# Patient Record
Sex: FEMALE | Race: WHITE | NOT HISPANIC OR LATINO | Employment: OTHER | URBAN - NONMETROPOLITAN AREA
[De-identification: names, ages, dates, MRNs, and addresses within clinical notes are randomized per-mention and may not be internally consistent; named-entity substitution may affect disease eponyms.]

---

## 2017-04-03 ENCOUNTER — OFFICE VISIT (OUTPATIENT)
Dept: CARDIOLOGY | Facility: CLINIC | Age: 62
End: 2017-04-03

## 2017-04-03 VITALS
HEIGHT: 65 IN | BODY MASS INDEX: 30.32 KG/M2 | HEART RATE: 95 BPM | RESPIRATION RATE: 16 BRPM | WEIGHT: 182 LBS | DIASTOLIC BLOOD PRESSURE: 91 MMHG | SYSTOLIC BLOOD PRESSURE: 125 MMHG

## 2017-04-03 DIAGNOSIS — I34.1 MITRAL VALVE PROLAPSE: Primary | ICD-10-CM

## 2017-04-03 DIAGNOSIS — Z87.898 HISTORY OF SYNCOPE: ICD-10-CM

## 2017-04-03 DIAGNOSIS — R06.09 DYSPNEA ON EXERTION: ICD-10-CM

## 2017-04-03 PROBLEM — I82.409 DEEP VEIN THROMBOSIS (HCC): Status: ACTIVE | Noted: 2017-04-03

## 2017-04-03 PROBLEM — R07.2 PRECORDIAL PAIN: Status: ACTIVE | Noted: 2017-04-03

## 2017-04-03 PROBLEM — R00.2 PALPITATIONS: Status: ACTIVE | Noted: 2017-04-03

## 2017-04-03 PROBLEM — I10 ESSENTIAL HYPERTENSION: Status: ACTIVE | Noted: 2017-04-03

## 2017-04-03 PROCEDURE — 99204 OFFICE O/P NEW MOD 45 MIN: CPT | Performed by: INTERNAL MEDICINE

## 2017-04-03 PROCEDURE — 93000 ELECTROCARDIOGRAM COMPLETE: CPT | Performed by: INTERNAL MEDICINE

## 2017-04-03 RX ORDER — METOPROLOL TARTRATE 50 MG/1
50 TABLET, FILM COATED ORAL 2 TIMES DAILY
COMMUNITY

## 2017-04-03 RX ORDER — DULOXETIN HYDROCHLORIDE 60 MG/1
120 CAPSULE, DELAYED RELEASE ORAL DAILY
COMMUNITY

## 2017-04-03 RX ORDER — LISINOPRIL 10 MG/1
10 TABLET ORAL EVERY OTHER DAY
COMMUNITY
End: 2019-04-08

## 2017-04-03 RX ORDER — ROSUVASTATIN CALCIUM 20 MG/1
20 TABLET, COATED ORAL DAILY
COMMUNITY

## 2017-04-03 RX ORDER — TRAVOPROST OPHTHALMIC SOLUTION 0.04 MG/ML
1 SOLUTION OPHTHALMIC EVERY EVENING
COMMUNITY
End: 2019-04-08

## 2017-04-03 RX ORDER — ASPIRIN 81 MG/1
81 TABLET ORAL WEEKLY
COMMUNITY

## 2017-04-03 NOTE — PROGRESS NOTES
"Haily Nava MD  Irene Wren  1955 04/03/2017    Patient Active Problem List   Diagnosis   • Essential hypertension   • History of syncope   • Palpitations   • Precordial pain   • Deep vein thrombosis   • Dyslipidemia   • Gastroesophageal reflux disease   • Mitral valve prolapse   • Pulmonary embolism   • Dyspnea on exertion       Dear Haily Nava MD:    Laya Wren is a 61 y.o. female with the problems as listed above, presents to establish cardiac care.    History of Present Illness: Patient reports a history of MVP and leaky heart valve.  She has relocated here from [place].  She reports feeling very fatigued/tired that has increased during the past 2 months.  This comes and goes.  She has complaints of palpitations with feeling of skipping and racing heartbeat. Four years ago she reports that she had a very bad episode of chest pain in which she called an ambulance and was admitted to the hospital.  Her cardiac workup at that time apparently was negative.  (Pertinent records not available to me at this time)  No recent significant chest pain reported.     A few days ago she felt her heart fluttering.  It went away on it's own.  She has complaints of occasional dizziness. She has a history of syncope, with last episode being last week.  She was walking down her hallway and went to go into another room when she passed out.  When she awoke she states she felt \"normal\".   She states she can usually tell before it happens.  Denies any associated palpitations with the syncope.  No complaints of headaches or focal neurologic symptoms.  No history of seizure problems  She has dyspnea with mild-mod exertion.  She's been getting tired and fatigued for the last few months.     Cardiac risk factors:hypercholesterolemia, hypertension and Age and postmenopausal status.    Allergies   Allergen Reactions   • Codeine    • Penicillins    • Sulfa Antibiotics    :      Current Outpatient " Prescriptions:   •  aspirin 81 MG EC tablet, Take 81 mg by mouth 1 (One) Time Per Week., Disp: , Rfl:   •  DULoxetine (CYMBALTA) 60 MG capsule, Take 120 mg by mouth Daily. Takes 2 60mg tab, Disp: , Rfl:   •  lisinopril (PRINIVIL,ZESTRIL) 10 MG tablet, Take  by mouth Daily., Disp: , Rfl:   •  metFORMIN (GLUCOPHAGE) 500 MG tablet, Take 500 mg by mouth 2 (Two) Times a Day With Meals., Disp: , Rfl:   •  metoprolol tartrate (LOPRESSOR) 50 MG tablet, Take 50 mg by mouth 2 (Two) Times a Day., Disp: , Rfl:   •  rosuvastatin (CRESTOR) 20 MG tablet, Take 20 mg by mouth Daily., Disp: , Rfl:   •  travoprost, ELIZABETH free, (TRAVATAN) 0.004 % solution ophthalmic solution, 1 drop Every Evening. in affected eye(s), Disp: , Rfl:     Past Medical History:   Diagnosis Date   • Clotting disorder    • Diabetes mellitus    • Hyperlipidemia    • Hypertension    • Mitral valve prolapse    • Valvular disease      Past Surgical History:   Procedure Laterality Date   • CATARACT EXTRACTION     •  SECTION      x2   • COLON SURGERY     • HERNIA REPAIR     • KNEE SURGERY       Family History   Problem Relation Age of Onset   • Heart disease Father    • Heart attack Father      Social History   Substance Use Topics   • Smoking status: Never Smoker   • Smokeless tobacco: Never Used   • Alcohol use Yes      Comment: social       Review of Systems   Constitution: Positive for malaise/fatigue. Negative for chills and fever.   HENT: Positive for headaches. Negative for nosebleeds and sore throat.    Eyes: Positive for vision loss in left eye.   Cardiovascular: Positive for palpitations.   Respiratory: Positive for shortness of breath. Negative for cough, hemoptysis and wheezing.    Gastrointestinal: Negative for abdominal pain, hematemesis, hematochezia, melena, nausea and vomiting.   Genitourinary: Negative for dysuria and hematuria.   Neurological: Positive for dizziness and light-headedness.   Psychiatric/Behavioral: Positive for depression.  "      Objective   Blood pressure 125/91, pulse 95, resp. rate 16, height 65\" (165.1 cm), weight 182 lb (82.6 kg).  Body mass index is 30.29 kg/(m^2).        Physical Exam   Constitutional: She is oriented to person, place, and time. She appears well-developed and well-nourished.   HENT:   Mouth/Throat: Oropharynx is clear and moist.   Eyes: EOM are normal. Pupils are equal, round, and reactive to light.   Neck: Neck supple. No JVD present. No tracheal deviation present. No thyromegaly present.   Cardiovascular: Normal rate, regular rhythm, S1 normal and S2 normal.  Exam reveals no gallop and no friction rub.    No murmur heard.  Pulmonary/Chest: Effort normal and breath sounds normal.   Abdominal: Soft. Bowel sounds are normal. She exhibits no mass. There is no tenderness.   Musculoskeletal: Normal range of motion. She exhibits no edema.   Lymphadenopathy:     She has no cervical adenopathy.   Neurological: She is alert and oriented to person, place, and time.   Skin: Skin is warm and dry. No rash noted.   Psychiatric: She has a normal mood and affect.   Vitals reviewed.          ECG 12 Lead  Date/Time: 4/3/2017 1:09 PM  Performed by: ANAND ACUNA  Authorized by: ANAND ACUNA   Rhythm: sinus rhythm  BPM: 90  Comments: Non specific st/t wave changes.  QTc 427              Assessment/Plan :  Irene was seen today for establish care, palpitations and shortness of breath.  Diagnoses and all orders for this visit:    1.  Mitral valve prolapse     Adult Transthoracic Echo Complete; Future  2.  History of syncope    Holter Monitor - 24 Hour; Future  3.  Dyspnea on exertion       Recommendations:    1. Evaluate with echo doppler study and 24 hour holter monitor.  2. Check BP twice daily.   3. Keep well hydrated.    Return in about 4 weeks (around 5/1/2017).    As always, I appreciate very much the opportunity to participate in the cardiovascular care of your patients.      With Best Regards,    Anand Acuna MD, " [facility]      Scribed for Anand Cornelius MD by Elly Cuevas CMA 4/3/2017  7:37 PM    I, Anand Cornelius MD, personally performed the services described in this documentation as scribed by the above named individual in my presence, and it is both accurate and complete.  4/3/2017  7:37 PM

## 2017-04-17 ENCOUNTER — HOSPITAL ENCOUNTER (OUTPATIENT)
Dept: RESPIRATORY THERAPY | Facility: HOSPITAL | Age: 62
Discharge: HOME | End: 2017-04-17
Attending: INTERNAL MEDICINE

## 2017-04-17 ENCOUNTER — HOSPITAL ENCOUNTER (OUTPATIENT)
Dept: CARDIOLOGY | Facility: HOSPITAL | Age: 62
Discharge: HOME | End: 2017-04-17
Attending: INTERNAL MEDICINE | Admitting: INTERNAL MEDICINE

## 2017-04-17 DIAGNOSIS — Z87.898 HISTORY OF SYNCOPE: ICD-10-CM

## 2017-04-17 DIAGNOSIS — I34.1 MITRAL VALVE PROLAPSE: ICD-10-CM

## 2017-04-17 PROCEDURE — 93306 TTE W/DOPPLER COMPLETE: CPT | Performed by: INTERNAL MEDICINE

## 2017-04-17 PROCEDURE — 93225 XTRNL ECG REC<48 HRS REC: CPT

## 2017-04-17 PROCEDURE — 93226 XTRNL ECG REC<48 HR SCAN A/R: CPT

## 2017-04-17 PROCEDURE — 93306 TTE W/DOPPLER COMPLETE: CPT

## 2017-04-21 LAB
BH CV ECHO MEAS - % IVS THICK: 34.9 %
BH CV ECHO MEAS - % LVPW THICK: 67.3 %
BH CV ECHO MEAS - ACS: 1.8 CM
BH CV ECHO MEAS - AO ROOT AREA (BSA CORRECTED): 1.7
BH CV ECHO MEAS - AO ROOT AREA: 7.8 CM^2
BH CV ECHO MEAS - AO ROOT DIAM: 3.1 CM
BH CV ECHO MEAS - BSA(HAYCOCK): 2 M^2
BH CV ECHO MEAS - BSA: 1.9 M^2
BH CV ECHO MEAS - BZI_BMI: 30.3 KILOGRAMS/M^2
BH CV ECHO MEAS - BZI_METRIC_HEIGHT: 165.1 CM
BH CV ECHO MEAS - BZI_METRIC_WEIGHT: 82.6 KG
BH CV ECHO MEAS - CONTRAST EF 4CH: 54.3 ML/M^2
BH CV ECHO MEAS - EDV(CUBED): 125.3 ML
BH CV ECHO MEAS - EDV(MOD-SP4): 46 ML
BH CV ECHO MEAS - EDV(TEICH): 118.4 ML
BH CV ECHO MEAS - EF(CUBED): 75.8 %
BH CV ECHO MEAS - EF(MOD-SP4): 54.3 %
BH CV ECHO MEAS - EF(TEICH): 67.6 %
BH CV ECHO MEAS - ESV(CUBED): 30.3 ML
BH CV ECHO MEAS - ESV(MOD-SP4): 21 ML
BH CV ECHO MEAS - ESV(TEICH): 38.4 ML
BH CV ECHO MEAS - FS: 37.7 %
BH CV ECHO MEAS - IVS/LVPW: 1
BH CV ECHO MEAS - IVSD: 1.1 CM
BH CV ECHO MEAS - IVSS: 1.5 CM
BH CV ECHO MEAS - LA DIMENSION: 4.1 CM
BH CV ECHO MEAS - LA/AO: 1.3
BH CV ECHO MEAS - LV DIASTOLIC VOL/BSA (35-75): 24.2 ML/M^2
BH CV ECHO MEAS - LV MASS(C)D: 209.3 GRAMS
BH CV ECHO MEAS - LV MASS(C)DI: 110.1 GRAMS/M^2
BH CV ECHO MEAS - LV MASS(C)S: 200.1 GRAMS
BH CV ECHO MEAS - LV MASS(C)SI: 105.3 GRAMS/M^2
BH CV ECHO MEAS - LV SYSTOLIC VOL/BSA (12-30): 11.1 ML/M^2
BH CV ECHO MEAS - LVIDD: 5 CM
BH CV ECHO MEAS - LVIDS: 3.1 CM
BH CV ECHO MEAS - LVLD AP4: 7.3 CM
BH CV ECHO MEAS - LVLS AP4: 6.2 CM
BH CV ECHO MEAS - LVOT AREA (M): 2.5 CM^2
BH CV ECHO MEAS - LVOT AREA: 2.7 CM^2
BH CV ECHO MEAS - LVOT DIAM: 1.8 CM
BH CV ECHO MEAS - LVPWD: 1.1 CM
BH CV ECHO MEAS - LVPWS: 1.9 CM
BH CV ECHO MEAS - MV A MAX VEL: 70.6 CM/SEC
BH CV ECHO MEAS - MV E MAX VEL: 51.8 CM/SEC
BH CV ECHO MEAS - MV E/A: 0.73
BH CV ECHO MEAS - PA ACC SLOPE: 778.6 CM/SEC^2
BH CV ECHO MEAS - PA ACC TIME: 0.1 SEC
BH CV ECHO MEAS - PA PR(ACCEL): 33.1 MMHG
BH CV ECHO MEAS - RAP SYSTOLE: 10 MMHG
BH CV ECHO MEAS - RVDD: 2.4 CM
BH CV ECHO MEAS - RVSP: 37.6 MMHG
BH CV ECHO MEAS - SI(CUBED): 50 ML/M^2
BH CV ECHO MEAS - SI(MOD-SP4): 13.2 ML/M^2
BH CV ECHO MEAS - SI(TEICH): 42.1 ML/M^2
BH CV ECHO MEAS - SV(CUBED): 95 ML
BH CV ECHO MEAS - SV(MOD-SP4): 25 ML
BH CV ECHO MEAS - SV(TEICH): 80 ML
BH CV ECHO MEAS - TR MAX VEL: 262.8 CM/SEC

## 2017-04-23 PROCEDURE — 93227 XTRNL ECG REC<48 HR R&I: CPT | Performed by: INTERNAL MEDICINE

## 2017-07-03 ENCOUNTER — OFFICE VISIT (OUTPATIENT)
Dept: CARDIOLOGY | Facility: CLINIC | Age: 62
End: 2017-07-03

## 2017-07-03 VITALS
WEIGHT: 181 LBS | HEIGHT: 65 IN | DIASTOLIC BLOOD PRESSURE: 84 MMHG | BODY MASS INDEX: 30.16 KG/M2 | SYSTOLIC BLOOD PRESSURE: 136 MMHG | HEART RATE: 73 BPM

## 2017-07-03 DIAGNOSIS — I10 ESSENTIAL HYPERTENSION: ICD-10-CM

## 2017-07-03 DIAGNOSIS — R00.2 PALPITATIONS: Primary | ICD-10-CM

## 2017-07-03 DIAGNOSIS — Z87.898 HISTORY OF SYNCOPE: ICD-10-CM

## 2017-07-03 DIAGNOSIS — E78.5 DYSLIPIDEMIA: ICD-10-CM

## 2017-07-03 PROCEDURE — 93000 ELECTROCARDIOGRAM COMPLETE: CPT | Performed by: INTERNAL MEDICINE

## 2017-07-03 PROCEDURE — 99213 OFFICE O/P EST LOW 20 MIN: CPT | Performed by: INTERNAL MEDICINE

## 2017-07-03 NOTE — PROGRESS NOTES
Haily Nava MD  Irene Wren  1955 07/03/2017    Patient Active Problem List   Diagnosis   • Essential hypertension   • History of syncope   • Palpitations   • Precordial pain   • Deep vein thrombosis   • Dyslipidemia   • Gastroesophageal reflux disease   • Mitral valve prolapse   • Pulmonary embolism   • Dyspnea on exertion       Dear Haily Nava MD:    Subjective     Irene Wren is a 62 y.o. female with the problems as listed above, presents      History of Present Illness:Ms. Wren is a pleasant 59-year-old  female with previous history of dizziness and palpitations and a remote history of mitral valve prolapse was evaluated recently with an echo Doppler study and Holter monitor and she is here now to review the results of the tests.  She denies any complaints of any further dizziness or syncope.  She denies any chest pain or palpitations.  Overall she's been feeling better.        Allergies   Allergen Reactions   • Codeine    • Penicillins    • Sulfa Antibiotics    :      Current Outpatient Prescriptions:   •  aspirin 81 MG EC tablet, Take 81 mg by mouth 1 (One) Time Per Week., Disp: , Rfl:   •  DULoxetine (CYMBALTA) 60 MG capsule, Take 120 mg by mouth Daily. Takes 2 60mg tab, Disp: , Rfl:   •  lisinopril (PRINIVIL,ZESTRIL) 10 MG tablet, Take 10 mg by mouth Every Other Day., Disp: , Rfl:   •  metFORMIN (GLUCOPHAGE) 500 MG tablet, Take 500 mg by mouth 2 (Two) Times a Day With Meals., Disp: , Rfl:   •  metoprolol tartrate (LOPRESSOR) 50 MG tablet, Take 50 mg by mouth 2 (Two) Times a Day., Disp: , Rfl:   •  rosuvastatin (CRESTOR) 20 MG tablet, Take 20 mg by mouth Daily., Disp: , Rfl:   •  travoprost, ELIZABETH free, (TRAVATAN) 0.004 % solution ophthalmic solution, 1 drop Every Evening. in affected eye(s), Disp: , Rfl:       The following portions of the patient's history were reviewed and updated as appropriate: allergies, current medications, past family history, past medical history, past  "social history, past surgical history and problem list.    Social History   Substance Use Topics   • Smoking status: Never Smoker   • Smokeless tobacco: Never Used   • Alcohol use Yes      Comment: social       Review of Systems   Constitution: Negative for chills and fever.   HENT: Negative for headaches, nosebleeds and sore throat.    Cardiovascular: Positive for palpitations (Races, skips, and flutters). Negative for chest pain, leg swelling and syncope.   Respiratory: Negative for cough, hemoptysis, shortness of breath and wheezing.    Gastrointestinal: Negative for abdominal pain, hematemesis, hematochezia, melena, nausea and vomiting.   Genitourinary: Negative for dysuria and hematuria.   Neurological: Negative for dizziness.       Objective   Vitals:    07/03/17 1331   BP: 136/84   BP Location: Right arm   Patient Position: Sitting   Cuff Size: Adult   Pulse: 73   Weight: 181 lb (82.1 kg)   Height: 65\" (165.1 cm)     Body mass index is 30.12 kg/(m^2).        Physical Exam   Constitutional: She is oriented to person, place, and time. She appears well-developed and well-nourished.   HENT:   Head: Normocephalic.   Eyes: Conjunctivae and EOM are normal.   Neck: Normal range of motion. Neck supple. No JVD present. No tracheal deviation present. No thyromegaly present.   Cardiovascular: Normal rate and regular rhythm.  Exam reveals no gallop and no friction rub.    No murmur heard.  Pulmonary/Chest: Breath sounds normal. No respiratory distress. She has no wheezes. She has no rales.   Abdominal: Soft. Bowel sounds are normal. She exhibits no mass. There is no tenderness.   Musculoskeletal: She exhibits no edema.   Neurological: She is alert and oriented to person, place, and time. No cranial nerve deficit.   Skin: Skin is warm and dry.   Psychiatric: She has a normal mood and affect.                            Echo Doppler study on 4/3/17 revealed:  · The study is technically difficult for diagnosis.  · Normal left " ventricular cavity size and wall thickness noted. All left ventricular wall segments contract normally.  · Estimated EF appears to be in the range of 61 - 65%.  · The aortic valve is not well visualized. The aortic valve is grossly normal in structure. No aortic valve regurgitation is present. No hemodynamically significant aortic valve stenosis is present.  · The mitral valve is normal in structure. No mitral valve regurgitation is present. No significant mitral valve stenosis is present.  · The tricuspid valve is normal. No tricuspid valve stenosis is present. No tricuspid valve regurgitation is present. Estimated right ventricular systolic pressure from tricuspid regurgitation is mildly elevated (35-45 mmHg).  · There is no evidence of pericardial effusion.      · Holter monitor recently on 4/3/17 revealed:  ·  The predominant rhythm noted during the testing period was sinus rhythm.  · Occasional PACs and PVCs.No SVT or V.Tach.  · Sinoatrial node conduction was normal. No atrioventricular block noted.  · Chest pain was reported during the monitoring period  · Chest pain had no correlations.            ECG 12 Lead  Date/Time: 7/3/2017 1:36 PM  Performed by: KASHIF ACUNA  Authorized by: KASHIF ACUNA   Rhythm: sinus rhythm  BPM: 71  Conduction: conduction normal  ST Segments: ST segments normal  T Waves: T waves normal  Other: no other findings  Clinical impression: normal ECG            Assessment/Plan      1. PalpitationsWith no significant arrhythmias noted on the Holter monitor recently     2. Essential hypertension , Controlled     3. History of syncope with no recurrence.      4. Dyslipidemia          Recommendations:    1. I reviewed the test results with the patient and reassured her.  2. Continue current medications.  3. We'll see her back in about 8-9 months.     Return in about 9 months (around 4/3/2018).    As always, I appreciate very much the opportunity to participate in the cardiovascular care  of your patients.      With Best Regards,    Anand Cornelius MD, [facility]    Dragon disclaimer:  Much of this encounter note is an electronic transcription/translation of spoken language to printed text. The electronic translation of spoken language may permit erroneous, or at times, nonsensical words or phrases to be inadvertently transcribed; Although I have reviewed the note for such errors, some may still exist.

## 2019-03-12 ENCOUNTER — OUTSIDE FACILITY SERVICE (OUTPATIENT)
Dept: CARDIOLOGY | Facility: CLINIC | Age: 64
End: 2019-03-12

## 2019-03-12 PROCEDURE — 93306 TTE W/DOPPLER COMPLETE: CPT | Performed by: INTERNAL MEDICINE

## 2019-04-08 ENCOUNTER — OFFICE VISIT (OUTPATIENT)
Dept: SURGERY | Facility: CLINIC | Age: 64
End: 2019-04-08

## 2019-04-08 VITALS
SYSTOLIC BLOOD PRESSURE: 175 MMHG | BODY MASS INDEX: 33.49 KG/M2 | OXYGEN SATURATION: 99 % | TEMPERATURE: 97.8 F | HEART RATE: 74 BPM | HEIGHT: 64 IN | DIASTOLIC BLOOD PRESSURE: 100 MMHG | WEIGHT: 196.2 LBS

## 2019-04-08 DIAGNOSIS — Z86.010 HISTORY OF COLON POLYPS: ICD-10-CM

## 2019-04-08 DIAGNOSIS — R10.13 EPIGASTRIC PAIN: Primary | ICD-10-CM

## 2019-04-08 PROCEDURE — 99203 OFFICE O/P NEW LOW 30 MIN: CPT | Performed by: SURGERY

## 2019-04-08 RX ORDER — BISACODYL 5 MG/1
5 TABLET, DELAYED RELEASE ORAL DAILY
Qty: 4 TABLET | Refills: 0 | Status: SHIPPED | OUTPATIENT
Start: 2019-04-08

## 2019-04-08 RX ORDER — PRAVASTATIN SODIUM 20 MG
20 TABLET ORAL DAILY
COMMUNITY

## 2019-04-08 RX ORDER — POLYETHYLENE GLYCOL 3350 17 G/17G
238 POWDER, FOR SOLUTION ORAL ONCE
Qty: 14 PACKET | Refills: 0 | Status: SHIPPED | OUTPATIENT
Start: 2019-04-08 | End: 2019-04-08

## 2019-04-08 NOTE — PROGRESS NOTES
Patient: Irene Wren    YOB: 1955    Date: 04/08/2019    Primary Care Provider: Haily Nava MD    Reason for Consultation: Hernia    Chief Complaint   Patient presents with   • Abdominal Pain       SUBJECTIVE:    History of present illness: Patient referred for abdominal pain.  About a month ago she had severe epigastric pain that she describes as a shock.  It lasted for several minutes.  She had another episode subsequently.  Since then she has had intermittent epigastric pain mild in nature.  The more recent episodes are usually after lifting.  No specific causative agent on the initial event a month ago.  She has no nausea or vomiting.  No reflux symptoms.  No lower GI complaints.  Nothing specifically otherwise made the pain better or worse.  Pain did not radiate.  Also has a history of a colon polyp requiring surgery for removal and had partial colectomy.  It has been at least 5 years since her last colonoscopy.    Review of Systems   Constitutional: Negative for chills, fever and unexpected weight change.   HENT: Negative for hearing loss, trouble swallowing and voice change.    Eyes: Negative for visual disturbance.   Respiratory: Negative for apnea, cough, chest tightness, shortness of breath and wheezing.    Cardiovascular: Negative for chest pain, palpitations and leg swelling.   Gastrointestinal: Positive for abdominal pain. Negative for abdominal distention, anal bleeding, blood in stool, constipation, diarrhea, nausea, rectal pain and vomiting.   Endocrine: Negative for cold intolerance and heat intolerance.   Genitourinary: Negative for difficulty urinating, dysuria and flank pain.   Musculoskeletal: Negative for back pain and gait problem.   Skin: Negative for color change, rash and wound.   Neurological: Negative for dizziness, syncope, speech difficulty, weakness, light-headedness, numbness and headaches.   Hematological: Negative for adenopathy. Does not bruise/bleed easily.  "  Psychiatric/Behavioral: Negative for confusion. The patient is not nervous/anxious.        History:  Past Medical History:   Diagnosis Date   • Clotting disorder (CMS/HCC)    • Diabetes mellitus (CMS/HCC)    • Hyperlipidemia    • Hypertension    • Mitral valve prolapse    • MVP (mitral valve prolapse)    • Valvular disease        Past Surgical History:   Procedure Laterality Date   • CATARACT EXTRACTION     •  SECTION      x2   • COLON SURGERY     • HERNIA REPAIR     • KNEE SURGERY         Family History   Problem Relation Age of Onset   • Heart disease Father    • Heart attack Father        Social History     Tobacco Use   • Smoking status: Never Smoker   • Smokeless tobacco: Never Used   Substance Use Topics   • Alcohol use: Yes     Comment: social   • Drug use: No       Allergies:  Allergies   Allergen Reactions   • Codeine    • Penicillins    • Sulfa Antibiotics    • Latex Rash       Medications:    Current Outpatient Medications:   •  aspirin 81 MG EC tablet, Take 81 mg by mouth 1 (One) Time Per Week., Disp: , Rfl:   •  DULoxetine (CYMBALTA) 60 MG capsule, Take 120 mg by mouth Daily. Takes 2 60mg tab, Disp: , Rfl:   •  linagliptin (TRADJENTA) 5 MG tablet tablet, Take 5 mg by mouth Daily., Disp: , Rfl:   •  metoprolol tartrate (LOPRESSOR) 50 MG tablet, Take 50 mg by mouth 2 (Two) Times a Day., Disp: , Rfl:   •  pravastatin (PRAVACHOL) 20 MG tablet, Take 20 mg by mouth Daily., Disp: , Rfl:   •  rosuvastatin (CRESTOR) 20 MG tablet, Take 20 mg by mouth Daily., Disp: , Rfl:     OBJECTIVE:    Vital Signs:   Vitals:    19 1427   BP: 175/100   Pulse: 74   Temp: 97.8 °F (36.6 °C)   SpO2: 99%   Weight: 89 kg (196 lb 3.2 oz)   Height: 162.6 cm (64\")       Physical Exam:   General Appearance:    Alert, cooperative, in no acute distress   Head:    Normocephalic, without obvious abnormality, atraumatic   Eyes:            Normal.  No scleral icterus.  PERRLA    Lungs:     Clear to auscultation,respirations " regular, even and                  unlabored    Heart:    Regular rhythm and normal rate, normal S1 and S2, no            murmur   Abdomen:     Normal bowel sounds, no masses, no organomegaly, soft        non-tender, non-distended, no guarding,.  Well-healed midline scars.   Extremities:   Moves all extremities well, no edema, no cyanosis, no             redness   Skin:   No bleeding, bruising or rash   Neurologic:   Normal without gross deficits.   Psychiatric: No evidence of depression or anxiety          Results Review:   I reviewed the patient's new clinical results.  CT was reviewed including the films.  I am not convinced that the patient has recurrent ventral hernias.  Certainly no obvious hernia at the previous patch site.    Review of Systems was reviewed and confirmed as accurate today.    ASSESSMENT/PLAN:    1. Epigastric pain    2. History of colon polyps        As far as the epigastric pain is concerned this seems to be more muscular in nature involving the abdominal wall.  It is possible it may be somehow related to her previous hernia repair although the repair site itself appears to be intact on the CT scan.  She has a couple of areas above the umbilicus where there is a question of possible small hernias on the CT scan report but I am not convinced that these are in fact hernias.  These would be well away from the area in question as her pain is more in the epigastric pain.  For now I have asked her to avoid heavy lifting since this seems to exacerbate her symptoms.  Follow-up as needed.  If her symptoms get worse she will follow-up with me.  I do not think an upper endoscopy is warranted at this time.    She has a history of adenomatous polyp that required partial colectomy for removal.  It has been 5 years since her last colonoscopy.  I recommend a colonoscopy due to this.  I explained the procedure to the patient as well as the risks of bleeding and perforation and they understand the  ramifications of these potential complications and they wish to proceed.    Electronically signed by Pedro Delvalle MD  04/08/19

## 2019-04-15 ENCOUNTER — OUTSIDE FACILITY SERVICE (OUTPATIENT)
Dept: SURGERY | Facility: CLINIC | Age: 64
End: 2019-04-15

## 2019-04-15 PROCEDURE — 45385 COLONOSCOPY W/LESION REMOVAL: CPT | Performed by: SURGERY

## 2020-10-19 ENCOUNTER — TELEPHONE (OUTPATIENT)
Dept: MAMMOGRAPHY | Facility: HOSPITAL | Age: 65
End: 2020-10-19

## 2020-12-14 ENCOUNTER — APPOINTMENT (OUTPATIENT)
Dept: MAMMOGRAPHY | Facility: HOSPITAL | Age: 65
End: 2020-12-14

## 2021-01-05 ENCOUNTER — HOSPITAL ENCOUNTER (OUTPATIENT)
Dept: MAMMOGRAPHY | Facility: HOSPITAL | Age: 66
Discharge: HOME | End: 2021-01-05
Admitting: FAMILY MEDICINE

## 2021-01-05 DIAGNOSIS — Z12.31 VISIT FOR SCREENING MAMMOGRAM: ICD-10-CM

## 2021-01-05 PROCEDURE — 77067 SCR MAMMO BI INCL CAD: CPT

## 2021-01-05 PROCEDURE — 77067 SCR MAMMO BI INCL CAD: CPT | Performed by: RADIOLOGY

## 2021-01-05 PROCEDURE — 77063 BREAST TOMOSYNTHESIS BI: CPT | Performed by: RADIOLOGY

## 2021-01-05 PROCEDURE — 77063 BREAST TOMOSYNTHESIS BI: CPT

## 2021-02-23 ENCOUNTER — HOSPITAL ENCOUNTER (OUTPATIENT)
Dept: ULTRASOUND IMAGING | Facility: HOSPITAL | Age: 66
Discharge: HOME | End: 2021-02-23
Admitting: RADIOLOGY

## 2021-02-23 DIAGNOSIS — R92.8 ABNORMAL MAMMOGRAM: ICD-10-CM

## 2021-02-23 PROCEDURE — 76642 ULTRASOUND BREAST LIMITED: CPT | Performed by: RADIOLOGY

## 2021-02-23 PROCEDURE — 76642 ULTRASOUND BREAST LIMITED: CPT

## 2021-06-16 ENCOUNTER — OUTSIDE FACILITY SERVICE (OUTPATIENT)
Dept: CARDIOLOGY | Facility: CLINIC | Age: 66
End: 2021-06-16

## 2021-06-16 PROCEDURE — 93306 TTE W/DOPPLER COMPLETE: CPT | Performed by: INTERNAL MEDICINE

## 2022-04-05 ENCOUNTER — OUTSIDE FACILITY SERVICE (OUTPATIENT)
Dept: CARDIOLOGY | Facility: CLINIC | Age: 67
End: 2022-04-05

## 2022-04-05 PROCEDURE — 93306 TTE W/DOPPLER COMPLETE: CPT | Performed by: INTERNAL MEDICINE

## 2023-02-05 ENCOUNTER — APPOINTMENT (OUTPATIENT)
Dept: ULTRASOUND IMAGING | Facility: HOSPITAL | Age: 68
End: 2023-02-05
Payer: MEDICARE

## 2023-02-05 VITALS
RESPIRATION RATE: 17 BRPM | HEART RATE: 103 BPM | TEMPERATURE: 98.8 F | SYSTOLIC BLOOD PRESSURE: 161 MMHG | BODY MASS INDEX: 29.37 KG/M2 | DIASTOLIC BLOOD PRESSURE: 89 MMHG | HEIGHT: 64 IN | OXYGEN SATURATION: 97 % | WEIGHT: 172 LBS

## 2023-02-05 LAB
ALBUMIN SERPL-MCNC: 4 G/DL (ref 3.5–5.2)
ALBUMIN/GLOB SERPL: 1.2 G/DL
ALP SERPL-CCNC: 94 U/L (ref 39–117)
ALT SERPL W P-5'-P-CCNC: 11 U/L (ref 1–33)
ANION GAP SERPL CALCULATED.3IONS-SCNC: 8.5 MMOL/L (ref 5–15)
APTT PPP: 26 SECONDS (ref 26.5–34.5)
AST SERPL-CCNC: 15 U/L (ref 1–32)
BASOPHILS # BLD AUTO: 0.08 10*3/MM3 (ref 0–0.2)
BASOPHILS NFR BLD AUTO: 0.9 % (ref 0–1.5)
BILIRUB SERPL-MCNC: 0.2 MG/DL (ref 0–1.2)
BUN SERPL-MCNC: 19 MG/DL (ref 8–23)
BUN/CREAT SERPL: 14 (ref 7–25)
CALCIUM SPEC-SCNC: 9.7 MG/DL (ref 8.6–10.5)
CHLORIDE SERPL-SCNC: 97 MMOL/L (ref 98–107)
CO2 SERPL-SCNC: 27.5 MMOL/L (ref 22–29)
CREAT SERPL-MCNC: 1.36 MG/DL (ref 0.57–1)
CRP SERPL-MCNC: 0.89 MG/DL (ref 0–0.5)
D-LACTATE SERPL-SCNC: 0.9 MMOL/L (ref 0.5–2)
DEPRECATED RDW RBC AUTO: 44.5 FL (ref 37–54)
EGFRCR SERPLBLD CKD-EPI 2021: 42.5 ML/MIN/1.73
EOSINOPHIL # BLD AUTO: 0.2 10*3/MM3 (ref 0–0.4)
EOSINOPHIL NFR BLD AUTO: 2.1 % (ref 0.3–6.2)
ERYTHROCYTE [DISTWIDTH] IN BLOOD BY AUTOMATED COUNT: 13.4 % (ref 12.3–15.4)
ERYTHROCYTE [SEDIMENTATION RATE] IN BLOOD: 39 MM/HR (ref 0–30)
GLOBULIN UR ELPH-MCNC: 3.4 GM/DL
GLUCOSE SERPL-MCNC: 117 MG/DL (ref 65–99)
HCT VFR BLD AUTO: 41.8 % (ref 34–46.6)
HGB BLD-MCNC: 13.7 G/DL (ref 12–15.9)
HOLD SPECIMEN: NORMAL
HOLD SPECIMEN: NORMAL
IMM GRANULOCYTES # BLD AUTO: 0.02 10*3/MM3 (ref 0–0.05)
IMM GRANULOCYTES NFR BLD AUTO: 0.2 % (ref 0–0.5)
INR PPP: 0.93 (ref 0.9–1.1)
LYMPHOCYTES # BLD AUTO: 3.41 10*3/MM3 (ref 0.7–3.1)
LYMPHOCYTES NFR BLD AUTO: 36.6 % (ref 19.6–45.3)
MCH RBC QN AUTO: 29.7 PG (ref 26.6–33)
MCHC RBC AUTO-ENTMCNC: 32.8 G/DL (ref 31.5–35.7)
MCV RBC AUTO: 90.7 FL (ref 79–97)
MONOCYTES # BLD AUTO: 0.52 10*3/MM3 (ref 0.1–0.9)
MONOCYTES NFR BLD AUTO: 5.6 % (ref 5–12)
NEUTROPHILS NFR BLD AUTO: 5.08 10*3/MM3 (ref 1.7–7)
NEUTROPHILS NFR BLD AUTO: 54.6 % (ref 42.7–76)
NRBC BLD AUTO-RTO: 0 /100 WBC (ref 0–0.2)
PLATELET # BLD AUTO: 265 10*3/MM3 (ref 140–450)
PMV BLD AUTO: 9.1 FL (ref 6–12)
POTASSIUM SERPL-SCNC: 3.8 MMOL/L (ref 3.5–5.2)
PROT SERPL-MCNC: 7.4 G/DL (ref 6–8.5)
PROTHROMBIN TIME: 12.7 SECONDS (ref 12.1–14.7)
RBC # BLD AUTO: 4.61 10*6/MM3 (ref 3.77–5.28)
SODIUM SERPL-SCNC: 133 MMOL/L (ref 136–145)
WBC NRBC COR # BLD: 9.31 10*3/MM3 (ref 3.4–10.8)
WHOLE BLOOD HOLD COAG: NORMAL
WHOLE BLOOD HOLD SPECIMEN: NORMAL

## 2023-02-05 PROCEDURE — 93971 EXTREMITY STUDY: CPT

## 2023-02-05 PROCEDURE — 83605 ASSAY OF LACTIC ACID: CPT | Performed by: PHYSICIAN ASSISTANT

## 2023-02-05 PROCEDURE — 80053 COMPREHEN METABOLIC PANEL: CPT | Performed by: PHYSICIAN ASSISTANT

## 2023-02-05 PROCEDURE — 85610 PROTHROMBIN TIME: CPT | Performed by: PHYSICIAN ASSISTANT

## 2023-02-05 PROCEDURE — 85730 THROMBOPLASTIN TIME PARTIAL: CPT | Performed by: PHYSICIAN ASSISTANT

## 2023-02-05 PROCEDURE — 87040 BLOOD CULTURE FOR BACTERIA: CPT | Performed by: PHYSICIAN ASSISTANT

## 2023-02-05 PROCEDURE — 85652 RBC SED RATE AUTOMATED: CPT | Performed by: PHYSICIAN ASSISTANT

## 2023-02-05 PROCEDURE — 86140 C-REACTIVE PROTEIN: CPT | Performed by: PHYSICIAN ASSISTANT

## 2023-02-05 PROCEDURE — 36415 COLL VENOUS BLD VENIPUNCTURE: CPT

## 2023-02-05 PROCEDURE — 85025 COMPLETE CBC W/AUTO DIFF WBC: CPT | Performed by: PHYSICIAN ASSISTANT

## 2023-02-05 PROCEDURE — 99282 EMERGENCY DEPT VISIT SF MDM: CPT

## 2023-02-06 ENCOUNTER — HOSPITAL ENCOUNTER (EMERGENCY)
Facility: HOSPITAL | Age: 68
Discharge: LEFT BEFORE DISCHARGE | End: 2023-02-06
Admitting: STUDENT IN AN ORGANIZED HEALTH CARE EDUCATION/TRAINING PROGRAM
Payer: MEDICARE

## 2023-02-06 NOTE — ED NOTES
Pt came to triage requesting doctor to come speak to her about results, so that she could go home. Called to main for doctor to speak with pt about results.

## 2023-02-06 NOTE — ED NOTES
Pt came to desk to let us know she was leaving. Explained to pt the need to stay; pt stated she had to be home with her grandson. Pt said she would follow up on results tomorrow.

## 2023-02-06 NOTE — ED NOTES
MEDICAL SCREENING:    Reason for Visit: Right upper arm swelling with pain.  History of DVT.  Patient initially seen in triage.  The patient was advised further evaluation and diagnostic testing will be needed, some of the treatment and testing will be initiated in the lobby in order to begin the process.  The patient will be returned to the waiting area for the time being and possibly be re-assessed by a subsequent ED provider.  The patient will be brought back to the treatment area in as timely manner as possible.         Porfirio Chavira PA  02/05/23 2008

## 2023-02-10 LAB
BACTERIA SPEC AEROBE CULT: NORMAL
BACTERIA SPEC AEROBE CULT: NORMAL

## 2023-02-16 ENCOUNTER — LAB (OUTPATIENT)
Dept: LAB | Facility: HOSPITAL | Age: 68
End: 2023-02-16
Payer: MEDICARE

## 2023-02-16 ENCOUNTER — TRANSCRIBE ORDERS (OUTPATIENT)
Dept: LAB | Facility: HOSPITAL | Age: 68
End: 2023-02-16
Payer: MEDICARE

## 2023-02-16 DIAGNOSIS — R93.89 ABNORMAL FINDINGS ON DIAGNOSTIC IMAGING OF OTHER SPECIFIED BODY STRUCTURES: ICD-10-CM

## 2023-02-16 DIAGNOSIS — G45.9 TIA (TRANSIENT ISCHEMIC ATTACK): Primary | ICD-10-CM

## 2023-02-16 DIAGNOSIS — G45.9 TIA (TRANSIENT ISCHEMIC ATTACK): ICD-10-CM

## 2023-02-16 PROCEDURE — 85025 COMPLETE CBC W/AUTO DIFF WBC: CPT

## 2023-02-16 PROCEDURE — 84443 ASSAY THYROID STIM HORMONE: CPT

## 2023-02-16 PROCEDURE — 84479 ASSAY OF THYROID (T3 OR T4): CPT

## 2023-02-16 PROCEDURE — 84436 ASSAY OF TOTAL THYROXINE: CPT

## 2023-02-16 PROCEDURE — 36415 COLL VENOUS BLD VENIPUNCTURE: CPT

## 2023-02-16 PROCEDURE — 80061 LIPID PANEL: CPT

## 2023-02-16 PROCEDURE — 80053 COMPREHEN METABOLIC PANEL: CPT

## 2023-02-17 LAB
ALBUMIN SERPL-MCNC: 3.9 G/DL (ref 3.5–5.2)
ALBUMIN/GLOB SERPL: 1.4 G/DL
ALP SERPL-CCNC: 83 U/L (ref 39–117)
ALT SERPL W P-5'-P-CCNC: 8 U/L (ref 1–33)
ANION GAP SERPL CALCULATED.3IONS-SCNC: 8 MMOL/L (ref 5–15)
AST SERPL-CCNC: 11 U/L (ref 1–32)
BASOPHILS # BLD AUTO: 0.06 10*3/MM3 (ref 0–0.2)
BASOPHILS NFR BLD AUTO: 0.9 % (ref 0–1.5)
BILIRUB SERPL-MCNC: 0.4 MG/DL (ref 0–1.2)
BUN SERPL-MCNC: 22 MG/DL (ref 8–23)
BUN/CREAT SERPL: 19.8 (ref 7–25)
CALCIUM SPEC-SCNC: 9.3 MG/DL (ref 8.6–10.5)
CHLORIDE SERPL-SCNC: 103 MMOL/L (ref 98–107)
CHOLEST SERPL-MCNC: 208 MG/DL (ref 0–200)
CO2 SERPL-SCNC: 29 MMOL/L (ref 22–29)
CREAT SERPL-MCNC: 1.11 MG/DL (ref 0.57–1)
DEPRECATED RDW RBC AUTO: 43.1 FL (ref 37–54)
EGFRCR SERPLBLD CKD-EPI 2021: 54.3 ML/MIN/1.73
EOSINOPHIL # BLD AUTO: 0.14 10*3/MM3 (ref 0–0.4)
EOSINOPHIL NFR BLD AUTO: 2 % (ref 0.3–6.2)
ERYTHROCYTE [DISTWIDTH] IN BLOOD BY AUTOMATED COUNT: 13.2 % (ref 12.3–15.4)
GLOBULIN UR ELPH-MCNC: 2.8 GM/DL
GLUCOSE SERPL-MCNC: 135 MG/DL (ref 65–99)
HCT VFR BLD AUTO: 40 % (ref 34–46.6)
HDLC SERPL-MCNC: 37 MG/DL (ref 40–60)
HGB BLD-MCNC: 13.2 G/DL (ref 12–15.9)
IMM GRANULOCYTES # BLD AUTO: 0.01 10*3/MM3 (ref 0–0.05)
IMM GRANULOCYTES NFR BLD AUTO: 0.1 % (ref 0–0.5)
LDLC SERPL CALC-MCNC: 141 MG/DL (ref 0–100)
LDLC/HDLC SERPL: 3.73 {RATIO}
LYMPHOCYTES # BLD AUTO: 1.92 10*3/MM3 (ref 0.7–3.1)
LYMPHOCYTES NFR BLD AUTO: 27.5 % (ref 19.6–45.3)
MCH RBC QN AUTO: 29.1 PG (ref 26.6–33)
MCHC RBC AUTO-ENTMCNC: 33 G/DL (ref 31.5–35.7)
MCV RBC AUTO: 88.1 FL (ref 79–97)
MONOCYTES # BLD AUTO: 0.39 10*3/MM3 (ref 0.1–0.9)
MONOCYTES NFR BLD AUTO: 5.6 % (ref 5–12)
NEUTROPHILS NFR BLD AUTO: 4.47 10*3/MM3 (ref 1.7–7)
NEUTROPHILS NFR BLD AUTO: 63.9 % (ref 42.7–76)
NRBC BLD AUTO-RTO: 0 /100 WBC (ref 0–0.2)
PLATELET # BLD AUTO: 268 10*3/MM3 (ref 140–450)
PMV BLD AUTO: 9.9 FL (ref 6–12)
POTASSIUM SERPL-SCNC: 4.7 MMOL/L (ref 3.5–5.2)
PROT SERPL-MCNC: 6.7 G/DL (ref 6–8.5)
RBC # BLD AUTO: 4.54 10*6/MM3 (ref 3.77–5.28)
SODIUM SERPL-SCNC: 140 MMOL/L (ref 136–145)
T-UPTAKE NFR SERPL: 0.98 TBI (ref 0.8–1.3)
T4 SERPL-MCNC: 7.03 MCG/DL (ref 4.5–11.7)
TRIGL SERPL-MCNC: 165 MG/DL (ref 0–150)
TSH SERPL DL<=0.05 MIU/L-ACNC: 1.7 UIU/ML (ref 0.27–4.2)
VLDLC SERPL-MCNC: 30 MG/DL (ref 5–40)
WBC NRBC COR # BLD: 6.99 10*3/MM3 (ref 3.4–10.8)

## 2023-09-20 ENCOUNTER — TRANSCRIBE ORDERS (OUTPATIENT)
Dept: LAB | Facility: HOSPITAL | Age: 68
End: 2023-09-20
Payer: MEDICARE

## 2023-09-20 ENCOUNTER — LAB (OUTPATIENT)
Dept: LAB | Facility: HOSPITAL | Age: 68
End: 2023-09-20
Payer: MEDICARE

## 2023-09-20 DIAGNOSIS — E11.9 DIABETES MELLITUS WITHOUT COMPLICATION: Primary | ICD-10-CM

## 2023-09-20 DIAGNOSIS — Z79.899 ENCOUNTER FOR LONG-TERM (CURRENT) USE OF OTHER MEDICATIONS: ICD-10-CM

## 2023-09-20 DIAGNOSIS — E11.9 DIABETES MELLITUS WITHOUT COMPLICATION: ICD-10-CM

## 2023-09-20 PROCEDURE — 80053 COMPREHEN METABOLIC PANEL: CPT

## 2023-09-20 PROCEDURE — 84443 ASSAY THYROID STIM HORMONE: CPT

## 2023-09-20 PROCEDURE — 84479 ASSAY OF THYROID (T3 OR T4): CPT

## 2023-09-20 PROCEDURE — 84436 ASSAY OF TOTAL THYROXINE: CPT

## 2023-09-20 PROCEDURE — 80061 LIPID PANEL: CPT

## 2023-09-20 PROCEDURE — 83036 HEMOGLOBIN GLYCOSYLATED A1C: CPT

## 2023-09-20 PROCEDURE — 36415 COLL VENOUS BLD VENIPUNCTURE: CPT

## 2023-09-20 PROCEDURE — 85025 COMPLETE CBC W/AUTO DIFF WBC: CPT

## 2023-09-21 LAB
ALBUMIN SERPL-MCNC: 4.2 G/DL (ref 3.5–5.2)
ALBUMIN/GLOB SERPL: 1.3 G/DL
ALP SERPL-CCNC: 78 U/L (ref 39–117)
ALT SERPL W P-5'-P-CCNC: 16 U/L (ref 1–33)
ANION GAP SERPL CALCULATED.3IONS-SCNC: 11.1 MMOL/L (ref 5–15)
AST SERPL-CCNC: 23 U/L (ref 1–32)
BASOPHILS # BLD AUTO: 0.07 10*3/MM3 (ref 0–0.2)
BASOPHILS NFR BLD AUTO: 1.2 % (ref 0–1.5)
BILIRUB SERPL-MCNC: 0.5 MG/DL (ref 0–1.2)
BUN SERPL-MCNC: 20 MG/DL (ref 8–23)
BUN/CREAT SERPL: 16 (ref 7–25)
CALCIUM SPEC-SCNC: 9.5 MG/DL (ref 8.6–10.5)
CHLORIDE SERPL-SCNC: 103 MMOL/L (ref 98–107)
CHOLEST SERPL-MCNC: 213 MG/DL (ref 0–200)
CO2 SERPL-SCNC: 25.9 MMOL/L (ref 22–29)
CREAT SERPL-MCNC: 1.25 MG/DL (ref 0.57–1)
DEPRECATED RDW RBC AUTO: 43.9 FL (ref 37–54)
EGFRCR SERPLBLD CKD-EPI 2021: 46.8 ML/MIN/1.73
EOSINOPHIL # BLD AUTO: 0.14 10*3/MM3 (ref 0–0.4)
EOSINOPHIL NFR BLD AUTO: 2.5 % (ref 0.3–6.2)
ERYTHROCYTE [DISTWIDTH] IN BLOOD BY AUTOMATED COUNT: 13.7 % (ref 12.3–15.4)
GLOBULIN UR ELPH-MCNC: 3.3 GM/DL
GLUCOSE SERPL-MCNC: 109 MG/DL (ref 65–99)
HBA1C MFR BLD: 5.8 % (ref 4.8–5.6)
HCT VFR BLD AUTO: 42.1 % (ref 34–46.6)
HDLC SERPL-MCNC: 41 MG/DL (ref 40–60)
HGB BLD-MCNC: 14.1 G/DL (ref 12–15.9)
IMM GRANULOCYTES # BLD AUTO: 0 10*3/MM3 (ref 0–0.05)
IMM GRANULOCYTES NFR BLD AUTO: 0 % (ref 0–0.5)
LDLC SERPL CALC-MCNC: 152 MG/DL (ref 0–100)
LDLC/HDLC SERPL: 3.67 {RATIO}
LYMPHOCYTES # BLD AUTO: 1.98 10*3/MM3 (ref 0.7–3.1)
LYMPHOCYTES NFR BLD AUTO: 34.7 % (ref 19.6–45.3)
MCH RBC QN AUTO: 29.6 PG (ref 26.6–33)
MCHC RBC AUTO-ENTMCNC: 33.5 G/DL (ref 31.5–35.7)
MCV RBC AUTO: 88.4 FL (ref 79–97)
MONOCYTES # BLD AUTO: 0.38 10*3/MM3 (ref 0.1–0.9)
MONOCYTES NFR BLD AUTO: 6.7 % (ref 5–12)
NEUTROPHILS NFR BLD AUTO: 3.13 10*3/MM3 (ref 1.7–7)
NEUTROPHILS NFR BLD AUTO: 54.9 % (ref 42.7–76)
NRBC BLD AUTO-RTO: 0 /100 WBC (ref 0–0.2)
PLATELET # BLD AUTO: 294 10*3/MM3 (ref 140–450)
PMV BLD AUTO: 9.8 FL (ref 6–12)
POTASSIUM SERPL-SCNC: 4.7 MMOL/L (ref 3.5–5.2)
PROT SERPL-MCNC: 7.5 G/DL (ref 6–8.5)
RBC # BLD AUTO: 4.76 10*6/MM3 (ref 3.77–5.28)
SODIUM SERPL-SCNC: 140 MMOL/L (ref 136–145)
T-UPTAKE NFR SERPL: 1.04 TBI (ref 0.8–1.3)
T4 SERPL-MCNC: 8.52 MCG/DL (ref 4.5–11.7)
TRIGL SERPL-MCNC: 108 MG/DL (ref 0–150)
TSH SERPL DL<=0.05 MIU/L-ACNC: 1.55 UIU/ML (ref 0.27–4.2)
VLDLC SERPL-MCNC: 20 MG/DL (ref 5–40)
WBC NRBC COR # BLD: 5.7 10*3/MM3 (ref 3.4–10.8)

## 2024-01-25 ENCOUNTER — TRANSCRIBE ORDERS (OUTPATIENT)
Dept: ADMINISTRATIVE | Facility: HOSPITAL | Age: 69
End: 2024-01-25
Payer: MEDICARE

## 2024-01-25 DIAGNOSIS — Z12.31 VISIT FOR SCREENING MAMMOGRAM: Primary | ICD-10-CM

## 2024-02-07 ENCOUNTER — HOSPITAL ENCOUNTER (OUTPATIENT)
Facility: HOSPITAL | Age: 69
Discharge: HOME | End: 2024-02-07
Admitting: FAMILY MEDICINE
Payer: MEDICARE

## 2024-02-07 DIAGNOSIS — Z12.31 VISIT FOR SCREENING MAMMOGRAM: ICD-10-CM

## 2024-02-07 PROCEDURE — 77067 SCR MAMMO BI INCL CAD: CPT

## 2024-02-07 PROCEDURE — 77063 BREAST TOMOSYNTHESIS BI: CPT

## 2024-03-07 ENCOUNTER — TELEPHONE (OUTPATIENT)
Dept: SURGERY | Facility: CLINIC | Age: 69
End: 2024-03-07
Payer: MEDICARE

## 2024-03-07 NOTE — TELEPHONE ENCOUNTER
FRANNIEM minh Bonilla to call and schedule for Colonoscopy. Pt on 5 yr recall. Mailing out letter.

## 2024-03-07 NOTE — TELEPHONE ENCOUNTER
"Pt called the office, she stated \"I missed a call from your office but I don't know who or why they called me.\"  I reviewed pt's chart apparently she is on 5 year recall for colonoscopy.  Pt stated \"well if that is what they wanted, I just had a colonoscopy 2 weeks ago in [place].\"  "

## 2024-05-02 ENCOUNTER — TRANSCRIBE ORDERS (OUTPATIENT)
Dept: ADMINISTRATIVE | Facility: HOSPITAL | Age: 69
End: 2024-05-02
Payer: MEDICARE

## 2024-05-02 DIAGNOSIS — Z12.11 SCREEN FOR COLON CANCER: Primary | ICD-10-CM

## 2024-05-02 DIAGNOSIS — K92.2 ACUTE GASTROINTESTINAL HEMORRHAGE: Primary | ICD-10-CM

## 2024-12-03 ENCOUNTER — HOSPITAL ENCOUNTER (OUTPATIENT)
Dept: GENERAL RADIOLOGY | Facility: HOSPITAL | Age: 69
Discharge: HOME | End: 2024-12-03
Admitting: FAMILY MEDICINE
Payer: MEDICARE

## 2024-12-03 ENCOUNTER — TRANSCRIBE ORDERS (OUTPATIENT)
Dept: LAB | Facility: HOSPITAL | Age: 69
End: 2024-12-03
Payer: MEDICARE

## 2024-12-03 DIAGNOSIS — R10.84 GENERALIZED ABDOMINAL PAIN: ICD-10-CM

## 2024-12-03 DIAGNOSIS — R10.9 ABDOMINAL PAIN, UNSPECIFIED ABDOMINAL LOCATION: Primary | ICD-10-CM

## 2024-12-03 PROCEDURE — 74018 RADEX ABDOMEN 1 VIEW: CPT

## 2025-01-02 ENCOUNTER — TRANSCRIBE ORDERS (OUTPATIENT)
Dept: ADMINISTRATIVE | Facility: HOSPITAL | Age: 70
End: 2025-01-02
Payer: MEDICARE

## 2025-01-02 DIAGNOSIS — K59.00 CONSTIPATION, UNSPECIFIED CONSTIPATION TYPE: Primary | ICD-10-CM

## 2025-01-09 ENCOUNTER — HOSPITAL ENCOUNTER (OUTPATIENT)
Facility: HOSPITAL | Age: 70
Discharge: HOME | End: 2025-01-09
Admitting: NURSE PRACTITIONER
Payer: MEDICARE

## 2025-01-09 DIAGNOSIS — K59.00 CONSTIPATION, UNSPECIFIED CONSTIPATION TYPE: ICD-10-CM

## 2025-01-09 PROCEDURE — 74150 CT ABDOMEN W/O CONTRAST: CPT

## 2025-01-09 PROCEDURE — 74150 CT ABDOMEN W/O CONTRAST: CPT | Performed by: RADIOLOGY

## 2025-01-13 ENCOUNTER — TRANSCRIBE ORDERS (OUTPATIENT)
Dept: ADMINISTRATIVE | Facility: HOSPITAL | Age: 70
End: 2025-01-13
Payer: MEDICARE

## 2025-01-13 DIAGNOSIS — Z78.0 ASYMPTOMATIC POSTMENOPAUSAL STATUS: Primary | ICD-10-CM

## 2025-01-20 ENCOUNTER — HOSPITAL ENCOUNTER (OUTPATIENT)
Facility: HOSPITAL | Age: 70
Discharge: HOME | End: 2025-01-20
Admitting: FAMILY MEDICINE
Payer: MEDICARE

## 2025-01-20 DIAGNOSIS — Z78.0 ASYMPTOMATIC POSTMENOPAUSAL STATUS: ICD-10-CM

## 2025-01-20 PROCEDURE — 77080 DXA BONE DENSITY AXIAL: CPT

## 2025-01-20 PROCEDURE — 77080 DXA BONE DENSITY AXIAL: CPT | Performed by: RADIOLOGY

## 2025-02-04 ENCOUNTER — OFFICE VISIT (OUTPATIENT)
Age: 70
End: 2025-02-04
Payer: MEDICARE

## 2025-02-04 VITALS
WEIGHT: 172 LBS | BODY MASS INDEX: 29.37 KG/M2 | OXYGEN SATURATION: 100 % | DIASTOLIC BLOOD PRESSURE: 79 MMHG | SYSTOLIC BLOOD PRESSURE: 147 MMHG | TEMPERATURE: 97 F | HEIGHT: 64 IN | HEART RATE: 97 BPM

## 2025-02-04 DIAGNOSIS — R06.09 DYSPNEA ON EXERTION: ICD-10-CM

## 2025-02-04 DIAGNOSIS — I34.1 MITRAL VALVE PROLAPSE: ICD-10-CM

## 2025-02-04 DIAGNOSIS — Z82.49 FAMILY HISTORY OF PREMATURE CORONARY ARTERY DISEASE: ICD-10-CM

## 2025-02-04 DIAGNOSIS — E11.9 TYPE 2 DIABETES MELLITUS WITHOUT COMPLICATION, WITHOUT LONG-TERM CURRENT USE OF INSULIN: ICD-10-CM

## 2025-02-04 DIAGNOSIS — R00.2 PALPITATIONS: ICD-10-CM

## 2025-02-04 DIAGNOSIS — I10 ESSENTIAL HYPERTENSION: ICD-10-CM

## 2025-02-04 DIAGNOSIS — R01.1 HEART MURMUR: ICD-10-CM

## 2025-02-04 DIAGNOSIS — R55 SYNCOPE AND COLLAPSE: ICD-10-CM

## 2025-02-04 DIAGNOSIS — R07.2 PRECORDIAL PAIN: Primary | ICD-10-CM

## 2025-02-04 DIAGNOSIS — E78.5 DYSLIPIDEMIA: ICD-10-CM

## 2025-02-04 PROCEDURE — 3077F SYST BP >= 140 MM HG: CPT | Performed by: INTERNAL MEDICINE

## 2025-02-04 PROCEDURE — 3078F DIAST BP <80 MM HG: CPT | Performed by: INTERNAL MEDICINE

## 2025-02-04 PROCEDURE — 93000 ELECTROCARDIOGRAM COMPLETE: CPT | Performed by: INTERNAL MEDICINE

## 2025-02-04 PROCEDURE — 99204 OFFICE O/P NEW MOD 45 MIN: CPT | Performed by: INTERNAL MEDICINE

## 2025-02-04 RX ORDER — ROSUVASTATIN CALCIUM 20 MG/1
20 TABLET, COATED ORAL DAILY
Qty: 30 TABLET | Refills: 11 | Status: SHIPPED | OUTPATIENT
Start: 2025-02-04

## 2025-02-04 RX ORDER — TRAVOPROST OPHTHALMIC SOLUTION 0.04 MG/ML
1 SOLUTION OPHTHALMIC
COMMUNITY

## 2025-02-04 RX ORDER — OLMESARTAN MEDOXOMIL 20 MG/1
20 TABLET ORAL DAILY
COMMUNITY

## 2025-02-04 RX ORDER — ERGOCALCIFEROL 1.25 MG/1
50000 CAPSULE, LIQUID FILLED ORAL WEEKLY
COMMUNITY

## 2025-02-04 RX ORDER — METOPROLOL SUCCINATE 25 MG/1
25 TABLET, EXTENDED RELEASE ORAL DAILY
Qty: 30 TABLET | Refills: 2 | Status: SHIPPED | OUTPATIENT
Start: 2025-02-04

## 2025-02-04 RX ORDER — ASPIRIN 81 MG/1
81 TABLET ORAL DAILY
Qty: 30 TABLET | Refills: 1 | Status: SHIPPED | OUTPATIENT
Start: 2025-02-04

## 2025-02-04 RX ORDER — LEVOTHYROXINE SODIUM 50 UG/1
50 TABLET ORAL
COMMUNITY

## 2025-02-04 NOTE — LETTER
February 4, 2025     Wandy Miller MD  [address]  [place] [address]  [facility] [address]    Patient: Irene Wren   YOB: 1955   Date of Visit: 2/4/2025     Dear Wandy Miller MD:       Thank you for referring Irene Wren to me for evaluation. Below are the relevant portions of my assessment and plan of care.    If you have questions, please do not hesitate to call me. I look forward to following Irene along with you.         Sincerely,        Anand Cornelius MD        CC: No Recipients    Anand Cornelius MD  02/04/25 1805  Sign when Signing Visit  Wandy Miller MD  Irene Wren  1955 02/04/2025    Patient Active Problem List   Diagnosis   • Essential hypertension   • History of syncope   • Palpitations   • Precordial pain   • Deep vein thrombosis   • Dyslipidemia   • Gastroesophageal reflux disease   • Mitral valve prolapse   • Pulmonary embolism   • Dyspnea on exertion       Dear Wandy Miller MD:    Subjective    Irene Wren is a 69 y.o. female with the problems as listed above, presents    Chief complaint: Recurrent chest pains and history of MVP.    History of Present Illness: Ms. Irene Cantor is a pleasant 69-year-old  female with history of mitral valve prolapse but no history of known coronary artery disease, presents with complaints of having recurrent episodes of chest pains over the last few months.  She describes these pains as being felt as sharp to dull pains in the substernal region that seem to occur at random and resolve spontaneously.  There is some associated shortness of breath but no sweating.  She has dyspnea with moderate exertion with no PND, orthopnea but has some intermittent bilateral leg edema.      She has some intermittent palpitations with rapid heartbeat and had episodes of syncope in the past with the last one about 3 months ago while she was at home when she started to have pain in her stomach and  then got dizzy and passed out.  She called her neighbor before she passed out and her neighbor came and noticed that she was passed out which she reportedly lasted for about 15 minutes.  There was reportedly no seizure-like activity.  She regained her consciousness spontaneously.  She did not seek any medical attention after this.  She has not had any more episodes of syncope since then     She has history of hypertension and type 2 diabetes mellitus.  She is a non-smoker.  She has a positive family history of premature coronary artery disease with her dad having his first heart attack at age 58 years of age.      Allergies   Allergen Reactions   • Codeine    • Morphine Other (See Comments)     Her whole body hurt.   • Penicillins    • Sulfa Antibiotics    • Latex Rash   :    Current Outpatient Medications:   •  Cyanocobalamin (VITAMIN B-12 PO), Take  by mouth., Disp: , Rfl:   •  DULoxetine (CYMBALTA) 60 MG capsule, Take 2 capsules by mouth Daily. Takes 2 60mg tab, Disp: , Rfl:   •  levothyroxine (SYNTHROID, LEVOTHROID) 50 MCG tablet, Take 1 tablet by mouth Every Morning., Disp: , Rfl:   •  linagliptin (TRADJENTA) 5 MG tablet tablet, Take 1 tablet by mouth Daily., Disp: , Rfl:   •  olmesartan (BENICAR) 20 MG tablet, Take 1 tablet by mouth Daily., Disp: , Rfl:   •  travoprost, ELIZABETH free, (TRAVATAN) 0.004 % solution ophthalmic solution, Apply 1 drop to eye(s) as directed by provider., Disp: , Rfl:   •  vitamin D (ERGOCALCIFEROL) 1.25 MG (44107 UT) capsule capsule, Take 1 capsule by mouth 1 (One) Time Per Week., Disp: , Rfl:   •  aspirin 81 MG EC tablet, Take 1 tablet by mouth Daily., Disp: 30 tablet, Rfl: 1  •  metoprolol succinate XL (TOPROL-XL) 25 MG 24 hr tablet, Take 1 tablet by mouth Daily., Disp: 30 tablet, Rfl: 2    Past Medical History:   Diagnosis Date   • Clotting disorder    • Diabetes mellitus    • Hyperlipidemia    • Hypertension    • Mitral valve prolapse    • MVP (mitral valve prolapse)    • Valvular  "disease      Past Surgical History:   Procedure Laterality Date   • BREAST BIOPSY Left 2018    benign   • CATARACT EXTRACTION     •  SECTION      x2   • COLON SURGERY     • HERNIA REPAIR     • KNEE SURGERY       Family History   Problem Relation Age of Onset   • Heart disease Father    • Heart attack Father    • Breast cancer Niece      Social History     Tobacco Use   • Smoking status: Never   • Smokeless tobacco: Never   Vaping Use   • Vaping status: Never Used   Substance Use Topics   • Alcohol use: Yes     Comment: social   • Drug use: No     Review of Systems   Constitutional: Positive for malaise/fatigue. Negative for chills, fever, weight gain and weight loss.   HENT:  Positive for hearing loss. Negative for congestion and nosebleeds.    Cardiovascular:  Positive for palpitations. Negative for chest pain, irregular heartbeat, leg swelling and orthopnea.   Respiratory:  Negative for cough, hemoptysis and shortness of breath.    Musculoskeletal:  Positive for back pain, joint pain, muscle weakness and myalgias.   Gastrointestinal:  Positive for abdominal pain and change in bowel habit. Negative for hematemesis, melena and vomiting.   Genitourinary:  Negative for dysuria, frequency and hematuria.   Neurological:  Positive for dizziness and headaches. Negative for focal weakness, light-headedness and weakness.   Psychiatric/Behavioral:  Positive for depression. The patient is nervous/anxious.      Objective  Blood pressure 147/79, pulse 97, temperature 97 °F (36.1 °C), height 162.6 cm (64\"), weight 78 kg (172 lb), SpO2 100%.  Body mass index is 29.52 kg/m².    Vitals reviewed.   Constitutional:       Appearance: Well-developed.   Eyes:      Conjunctiva/sclera: Conjunctivae normal.   HENT:      Head: Normocephalic.   Neck:      Thyroid: No thyromegaly.      Vascular: No JVD.      Trachea: No tracheal deviation.   Pulmonary:      Effort: No respiratory distress.      Breath sounds: Normal breath sounds. No " wheezing. No rales.   Cardiovascular:      PMI at left midclavicular line. Normal rate. Regular rhythm. Normal S1. Normal S2.       Murmurs: There is a grade 2/6 harsh midsystolic murmur at the URSB, radiating to the neck.      No gallop.  No click. No rub.   Pulses:     Carotid: 2+ bilaterally.     Radial: 2+ bilaterally.     Femoral: 2+ bilaterally.     Dorsalis pedis: 2+ bilaterally.     Posterior tibial: 2+ bilaterally.  Edema:     Peripheral edema absent.   Abdominal:      General: Bowel sounds are normal.      Palpations: Abdomen is soft. There is no abdominal mass.      Tenderness: There is no abdominal tenderness.   Musculoskeletal:      Cervical back: Normal range of motion and neck supple. Skin:     General: Skin is warm and dry.   Neurological:      Mental Status: Alert and oriented to person, place, and time.      Cranial Nerves: No cranial nerve deficit.       Lab Results   Component Value Date     09/20/2023    K 4.7 09/20/2023     09/20/2023    CO2 25.9 09/20/2023    BUN 20 09/20/2023    CREATININE 1.25 (H) 09/20/2023    GLUCOSE 109 (H) 09/20/2023    CALCIUM 9.5 09/20/2023    AST 23 09/20/2023    ALT 16 09/20/2023    ALKPHOS 78 09/20/2023       Lab Results   Component Value Date    WBC 7.4 01/13/2025    HGB 12.8 01/13/2025    HCT 39.8 01/13/2025     01/13/2025     Lab Results   Component Value Date    INR 0.93 02/05/2023       Lab Results   Component Value Date    TSH 1.550 09/20/2023    TRIG 108 09/20/2023    HDL 41 09/20/2023     (H) 09/20/2023       ECG 12 Lead    Date/Time: 2/4/2025 1:04 PM  Performed by: Anand Cornelius MD    Authorized by: Anand Cornelius MD  Comparison: compared with previous ECG from 7/3/2017  Similar to previous ECG  Rhythm: sinus rhythm  Conduction: conduction normal  Other findings: non-specific ST-T wave changes  Comments: QTc: 398 ms              Assessment & Plan   Diagnosis Plan   1. Precordial pain  ECG 12 Lead    Stress Test With Myocardial  Perfusion (1 Day)      2. Syncope and collapse        3. Dyspnea on exertion  Adult Transthoracic Echo Complete w/ Color, Spectral and Contrast if necessary per protocol      4. Mitral valve prolapse        5. Heart murmur        6. Dyslipidemia        7. Essential hypertension        8. Family history of premature coronary artery disease        9. Type 2 diabetes mellitus without complication, without long-term current use of insulin            Recommendations  Orders Placed This Encounter   Procedures   • Stress Test With Myocardial Perfusion (1 Day)   • ECG 12 Lead   • Adult Transthoracic Echo Complete w/ Color, Spectral and Contrast if necessary per protocol        Will add enteric-coated aspirin 81 mg daily and metoprolol succinate 25 mg daily for now  Will evaluate her chest pains further with stress sestamibi study .  Evaluate her dyspnea, heart murmur and MVP with an echo Doppler study.  Will evaluate her palpitations and syncope with an event monitor.  Will add rosuvastatin 20 mg daily for her dyslipidemia.  Check lipid panel and CMP in a month.    Return in about 5 weeks (around 3/11/2025).    As always,   I appreciate very much the opportunity to participate in the cardiovascular care of your patients. Please do not hesitate to call me with any questions with regards to Irene Holger's evaluation and management.     With Best Regards,        Anand Cornelius MD, [facility]    Please note that portions of this note were completed with a voice recognition program.

## 2025-02-04 NOTE — PROGRESS NOTES
Wadny Miller MD  Irene Wren  1955 02/04/2025    Patient Active Problem List   Diagnosis    Essential hypertension    History of syncope    Palpitations    Precordial pain    Deep vein thrombosis    Dyslipidemia    Gastroesophageal reflux disease    Mitral valve prolapse    Pulmonary embolism    Dyspnea on exertion       Dear Wandy Miller MD:    Laya Wren is a 69 y.o. female with the problems as listed above, presents    Chief complaint: Recurrent chest pains and history of MVP.    History of Present Illness: Ms. Irene Cantor is a pleasant 69-year-old  female with history of mitral valve prolapse but no history of known coronary artery disease, presents with complaints of having recurrent episodes of chest pains over the last few months.  She describes these pains as being felt as sharp to dull pains in the substernal region that seem to occur at random and resolve spontaneously.  There is some associated shortness of breath but no sweating.  She has dyspnea with moderate exertion with no PND, orthopnea but has some intermittent bilateral leg edema.      She has some intermittent palpitations with rapid heartbeat and had episodes of syncope in the past with the last one about 3 months ago while she was at home when she started to have pain in her stomach and then got dizzy and passed out.  She called her neighbor before she passed out and her neighbor came and noticed that she was passed out which she reportedly lasted for about 15 minutes.  There was reportedly no seizure-like activity.  She regained her consciousness spontaneously.  She did not seek any medical attention after this.  She has not had any more episodes of syncope since then     She has history of hypertension and type 2 diabetes mellitus.  She is a non-smoker.  She has a positive family history of premature coronary artery disease with her dad having his first heart attack at age 58  years of age.      Allergies   Allergen Reactions    Codeine     Morphine Other (See Comments)     Her whole body hurt.    Penicillins     Sulfa Antibiotics     Latex Rash   :    Current Outpatient Medications:     Cyanocobalamin (VITAMIN B-12 PO), Take  by mouth., Disp: , Rfl:     DULoxetine (CYMBALTA) 60 MG capsule, Take 2 capsules by mouth Daily. Takes 2 60mg tab, Disp: , Rfl:     levothyroxine (SYNTHROID, LEVOTHROID) 50 MCG tablet, Take 1 tablet by mouth Every Morning., Disp: , Rfl:     linagliptin (TRADJENTA) 5 MG tablet tablet, Take 1 tablet by mouth Daily., Disp: , Rfl:     olmesartan (BENICAR) 20 MG tablet, Take 1 tablet by mouth Daily., Disp: , Rfl:     travoprost, ELIZABETH free, (TRAVATAN) 0.004 % solution ophthalmic solution, Apply 1 drop to eye(s) as directed by provider., Disp: , Rfl:     vitamin D (ERGOCALCIFEROL) 1.25 MG (53570 UT) capsule capsule, Take 1 capsule by mouth 1 (One) Time Per Week., Disp: , Rfl:     aspirin 81 MG EC tablet, Take 1 tablet by mouth Daily., Disp: 30 tablet, Rfl: 1    metoprolol succinate XL (TOPROL-XL) 25 MG 24 hr tablet, Take 1 tablet by mouth Daily., Disp: 30 tablet, Rfl: 2    Past Medical History:   Diagnosis Date    Clotting disorder     Diabetes mellitus     Hyperlipidemia     Hypertension     Mitral valve prolapse     MVP (mitral valve prolapse)     Valvular disease      Past Surgical History:   Procedure Laterality Date    BREAST BIOPSY Left 2018    benign    CATARACT EXTRACTION       SECTION      x2    COLON SURGERY      HERNIA REPAIR      KNEE SURGERY       Family History   Problem Relation Age of Onset    Heart disease Father     Heart attack Father     Breast cancer Niece      Social History     Tobacco Use    Smoking status: Never    Smokeless tobacco: Never   Vaping Use    Vaping status: Never Used   Substance Use Topics    Alcohol use: Yes     Comment: social    Drug use: No     Review of Systems   Constitutional: Positive for malaise/fatigue. Negative for  "chills, fever, weight gain and weight loss.   HENT:  Positive for hearing loss. Negative for congestion and nosebleeds.    Cardiovascular:  Positive for palpitations. Negative for chest pain, irregular heartbeat, leg swelling and orthopnea.   Respiratory:  Negative for cough, hemoptysis and shortness of breath.    Musculoskeletal:  Positive for back pain, joint pain, muscle weakness and myalgias.   Gastrointestinal:  Positive for abdominal pain and change in bowel habit. Negative for hematemesis, melena and vomiting.   Genitourinary:  Negative for dysuria, frequency and hematuria.   Neurological:  Positive for dizziness and headaches. Negative for focal weakness, light-headedness and weakness.   Psychiatric/Behavioral:  Positive for depression. The patient is nervous/anxious.      Objective   Blood pressure 147/79, pulse 97, temperature 97 °F (36.1 °C), height 162.6 cm (64\"), weight 78 kg (172 lb), SpO2 100%.  Body mass index is 29.52 kg/m².    Vitals reviewed.   Constitutional:       Appearance: Well-developed.   Eyes:      Conjunctiva/sclera: Conjunctivae normal.   HENT:      Head: Normocephalic.   Neck:      Thyroid: No thyromegaly.      Vascular: No JVD.      Trachea: No tracheal deviation.   Pulmonary:      Effort: No respiratory distress.      Breath sounds: Normal breath sounds. No wheezing. No rales.   Cardiovascular:      PMI at left midclavicular line. Normal rate. Regular rhythm. Normal S1. Normal S2.       Murmurs: There is a grade 2/6 harsh midsystolic murmur at the URSB, radiating to the neck.      No gallop.  No click. No rub.   Pulses:     Carotid: 2+ bilaterally.     Radial: 2+ bilaterally.     Femoral: 2+ bilaterally.     Dorsalis pedis: 2+ bilaterally.     Posterior tibial: 2+ bilaterally.  Edema:     Peripheral edema absent.   Abdominal:      General: Bowel sounds are normal.      Palpations: Abdomen is soft. There is no abdominal mass.      Tenderness: There is no abdominal tenderness. "   Musculoskeletal:      Cervical back: Normal range of motion and neck supple. Skin:     General: Skin is warm and dry.   Neurological:      Mental Status: Alert and oriented to person, place, and time.      Cranial Nerves: No cranial nerve deficit.       Lab Results   Component Value Date     09/20/2023    K 4.7 09/20/2023     09/20/2023    CO2 25.9 09/20/2023    BUN 20 09/20/2023    CREATININE 1.25 (H) 09/20/2023    GLUCOSE 109 (H) 09/20/2023    CALCIUM 9.5 09/20/2023    AST 23 09/20/2023    ALT 16 09/20/2023    ALKPHOS 78 09/20/2023       Lab Results   Component Value Date    WBC 7.4 01/13/2025    HGB 12.8 01/13/2025    HCT 39.8 01/13/2025     01/13/2025     Lab Results   Component Value Date    INR 0.93 02/05/2023       Lab Results   Component Value Date    TSH 1.550 09/20/2023    TRIG 108 09/20/2023    HDL 41 09/20/2023     (H) 09/20/2023       ECG 12 Lead    Date/Time: 2/4/2025 1:04 PM  Performed by: Anand Cornelius MD    Authorized by: Anand Cornelius MD  Comparison: compared with previous ECG from 7/3/2017  Similar to previous ECG  Rhythm: sinus rhythm  Conduction: conduction normal  Other findings: non-specific ST-T wave changes  Comments: QTc: 398 ms              Assessment & Plan    Diagnosis Plan   1. Precordial pain  ECG 12 Lead    Stress Test With Myocardial Perfusion (1 Day)      2. Syncope and collapse        3. Dyspnea on exertion  Adult Transthoracic Echo Complete w/ Color, Spectral and Contrast if necessary per protocol      4. Mitral valve prolapse        5. Heart murmur        6. Dyslipidemia        7. Essential hypertension        8. Family history of premature coronary artery disease        9. Type 2 diabetes mellitus without complication, without long-term current use of insulin            Recommendations  Orders Placed This Encounter   Procedures    Stress Test With Myocardial Perfusion (1 Day)    ECG 12 Lead    Adult Transthoracic Echo Complete w/ Color, Spectral  and Contrast if necessary per protocol        Will add enteric-coated aspirin 81 mg daily and metoprolol succinate 25 mg daily for now  Will evaluate her chest pains further with stress sestamibi study .  Evaluate her dyspnea, heart murmur and MVP with an echo Doppler study.  Will evaluate her palpitations and syncope with an event monitor.  Will add rosuvastatin 20 mg daily for her dyslipidemia.  Check lipid panel and CMP in a month.    Return in about 5 weeks (around 3/11/2025).    As always,   I appreciate very much the opportunity to participate in the cardiovascular care of your patients. Please do not hesitate to call me with any questions with regards to Irene Wren's evaluation and management.     With Best Regards,        Anand Cornelius MD, FACC    Please note that portions of this note were completed with a voice recognition program.

## 2025-03-09 LAB
CV ZIO BASELINE AVG BPM: 84
CV ZIO BASELINE BPM HIGH: 156
CV ZIO BASELINE BPM LOW: 63

## 2025-03-10 ENCOUNTER — HOSPITAL ENCOUNTER (OUTPATIENT)
Dept: NUCLEAR MEDICINE | Facility: HOSPITAL | Age: 70
Discharge: HOME | End: 2025-03-10
Payer: MEDICARE

## 2025-03-10 ENCOUNTER — HOSPITAL ENCOUNTER (OUTPATIENT)
Dept: CARDIOLOGY | Facility: HOSPITAL | Age: 70
Discharge: HOME | End: 2025-03-10
Payer: MEDICARE

## 2025-03-10 DIAGNOSIS — R07.2 PRECORDIAL PAIN: ICD-10-CM

## 2025-03-10 DIAGNOSIS — R06.09 DYSPNEA ON EXERTION: ICD-10-CM

## 2025-03-10 LAB
BH CV REST NUCLEAR ISOTOPE DOSE: 9.3 MCI
BH CV STRESS BP STAGE 1: NORMAL
BH CV STRESS DURATION MIN STAGE 1: 3
BH CV STRESS DURATION MIN STAGE 2: 1
BH CV STRESS DURATION SEC STAGE 1: 0
BH CV STRESS DURATION SEC STAGE 2: 0
BH CV STRESS GRADE STAGE 1: 10
BH CV STRESS GRADE STAGE 2: 12
BH CV STRESS HR STAGE 1: 134
BH CV STRESS HR STAGE 2: 150
BH CV STRESS METS STAGE 1: 5
BH CV STRESS METS STAGE 2: 7.5
BH CV STRESS NUCLEAR ISOTOPE DOSE: 29.2 MCI
BH CV STRESS PROTOCOL 1: NORMAL
BH CV STRESS RECOVERY BP: NORMAL MMHG
BH CV STRESS RECOVERY HR: 86 BPM
BH CV STRESS SPEED STAGE 1: 1.7
BH CV STRESS SPEED STAGE 2: 2.5
BH CV STRESS STAGE 1: 1
BH CV STRESS STAGE 2: 2
MAXIMAL PREDICTED HEART RATE: 150 BPM
PERCENT MAX PREDICTED HR: 100 %
SPECT HRT GATED+EF W RNC IV: 70 %
STRESS BASELINE BP: NORMAL MMHG
STRESS BASELINE HR: 88 BPM
STRESS PERCENT HR: 118 %
STRESS POST ESTIMATED WORKLOAD: 7 METS
STRESS POST EXERCISE DUR MIN: 4 MIN
STRESS POST EXERCISE DUR SEC: 0 SEC
STRESS POST PEAK BP: NORMAL MMHG
STRESS POST PEAK HR: 150 BPM
STRESS TARGET HR: 128 BPM

## 2025-03-10 PROCEDURE — 34310000005 TECHNETIUM SESTAMIBI: Performed by: INTERNAL MEDICINE

## 2025-03-10 PROCEDURE — 78452 HT MUSCLE IMAGE SPECT MULT: CPT | Performed by: INTERNAL MEDICINE

## 2025-03-10 PROCEDURE — 93306 TTE W/DOPPLER COMPLETE: CPT

## 2025-03-10 PROCEDURE — 78452 HT MUSCLE IMAGE SPECT MULT: CPT

## 2025-03-10 PROCEDURE — A9500 TC99M SESTAMIBI: HCPCS | Performed by: INTERNAL MEDICINE

## 2025-03-10 PROCEDURE — 93017 CV STRESS TEST TRACING ONLY: CPT

## 2025-03-10 PROCEDURE — 93018 CV STRESS TEST I&R ONLY: CPT | Performed by: INTERNAL MEDICINE

## 2025-03-10 RX ADMIN — TECHNETIUM TC 99M SESTAMIBI 1 DOSE: 1 INJECTION INTRAVENOUS at 11:20

## 2025-03-10 RX ADMIN — TECHNETIUM TC 99M SESTAMIBI 1 DOSE: 1 INJECTION INTRAVENOUS at 10:18

## 2025-03-12 LAB
AV MEAN PRESS GRAD SYS DOP V1V2: 8.3 MMHG
AV VMAX SYS DOP: 192.7 CM/SEC
BH CV ECHO MEAS - AO MAX PG: 14.9 MMHG
BH CV ECHO MEAS - AO ROOT DIAM: 3 CM
BH CV ECHO MEAS - AO V2 VTI: 39 CM
BH CV ECHO MEAS - AVA(I,D): 1.44 CM2
BH CV ECHO MEAS - EDV(CUBED): 42.9 ML
BH CV ECHO MEAS - EDV(MOD-SP2): 33.9 ML
BH CV ECHO MEAS - EDV(MOD-SP4): 48.7 ML
BH CV ECHO MEAS - EF(MOD-SP2): 55.2 %
BH CV ECHO MEAS - EF(MOD-SP4): 57.7 %
BH CV ECHO MEAS - ESV(CUBED): 8 ML
BH CV ECHO MEAS - ESV(MOD-SP2): 15.2 ML
BH CV ECHO MEAS - ESV(MOD-SP4): 20.6 ML
BH CV ECHO MEAS - FS: 42.9 %
BH CV ECHO MEAS - IVS/LVPW: 0.9 CM
BH CV ECHO MEAS - IVSD: 0.9 CM
BH CV ECHO MEAS - LA DIMENSION: 3.9 CM
BH CV ECHO MEAS - LAT PEAK E' VEL: 9.9 CM/SEC
BH CV ECHO MEAS - LV DIASTOLIC VOL/BSA (35-75): 26.5 CM2
BH CV ECHO MEAS - LV MASS(C)D: 95.9 GRAMS
BH CV ECHO MEAS - LV MAX PG: 2.8 MMHG
BH CV ECHO MEAS - LV MEAN PG: 2 MMHG
BH CV ECHO MEAS - LV SYSTOLIC VOL/BSA (12-30): 11.2 CM2
BH CV ECHO MEAS - LV V1 MAX: 84.2 CM/SEC
BH CV ECHO MEAS - LV V1 VTI: 17.9 CM
BH CV ECHO MEAS - LVIDD: 3.5 CM
BH CV ECHO MEAS - LVIDS: 2 CM
BH CV ECHO MEAS - LVOT AREA: 3.1 CM2
BH CV ECHO MEAS - LVOT DIAM: 2 CM
BH CV ECHO MEAS - LVPWD: 1 CM
BH CV ECHO MEAS - MED PEAK E' VEL: 7.3 CM/SEC
BH CV ECHO MEAS - MV A MAX VEL: 91.9 CM/SEC
BH CV ECHO MEAS - MV E MAX VEL: 55.8 CM/SEC
BH CV ECHO MEAS - MV E/A: 0.61
BH CV ECHO MEAS - PA ACC TIME: 0.12 SEC
BH CV ECHO MEAS - RAP SYSTOLE: 10 MMHG
BH CV ECHO MEAS - RVSP: 43.4 MMHG
BH CV ECHO MEAS - SV(LVOT): 56.2 ML
BH CV ECHO MEAS - SV(MOD-SP2): 18.7 ML
BH CV ECHO MEAS - SV(MOD-SP4): 28.1 ML
BH CV ECHO MEAS - SVI(LVOT): 30.7 ML/M2
BH CV ECHO MEAS - SVI(MOD-SP2): 10.2 ML/M2
BH CV ECHO MEAS - SVI(MOD-SP4): 15.3 ML/M2
BH CV ECHO MEAS - TAPSE (>1.6): 2.36 CM
BH CV ECHO MEAS - TR MAX PG: 33.4 MMHG
BH CV ECHO MEAS - TR MAX VEL: 289 CM/SEC
BH CV ECHO MEASUREMENTS AVERAGE E/E' RATIO: 6.49
LEFT ATRIUM VOLUME INDEX: 23.2 ML/M2
LV EF BIPLANE MOD: 58.4 %

## 2025-03-14 ENCOUNTER — TRANSCRIBE ORDERS (OUTPATIENT)
Dept: ADMINISTRATIVE | Facility: HOSPITAL | Age: 70
End: 2025-03-14
Payer: MEDICARE

## 2025-03-14 DIAGNOSIS — Z12.31 VISIT FOR SCREENING MAMMOGRAM: ICD-10-CM

## 2025-03-14 DIAGNOSIS — R06.09 DYSPNEA ON EXERTION: Primary | ICD-10-CM

## 2025-03-18 ENCOUNTER — OFFICE VISIT (OUTPATIENT)
Age: 70
End: 2025-03-18
Payer: MEDICARE

## 2025-03-18 VITALS
BODY MASS INDEX: 29.88 KG/M2 | HEART RATE: 93 BPM | WEIGHT: 175 LBS | SYSTOLIC BLOOD PRESSURE: 143 MMHG | DIASTOLIC BLOOD PRESSURE: 78 MMHG | HEIGHT: 64 IN | OXYGEN SATURATION: 99 %

## 2025-03-18 DIAGNOSIS — I35.0 AORTIC STENOSIS, MILD: ICD-10-CM

## 2025-03-18 DIAGNOSIS — R06.09 DYSPNEA ON EXERTION: Primary | ICD-10-CM

## 2025-03-18 DIAGNOSIS — I10 ESSENTIAL HYPERTENSION: ICD-10-CM

## 2025-03-18 DIAGNOSIS — I27.20 MILD PULMONARY HYPERTENSION: ICD-10-CM

## 2025-03-18 PROCEDURE — 3077F SYST BP >= 140 MM HG: CPT | Performed by: INTERNAL MEDICINE

## 2025-03-18 PROCEDURE — 3078F DIAST BP <80 MM HG: CPT | Performed by: INTERNAL MEDICINE

## 2025-03-18 PROCEDURE — 99214 OFFICE O/P EST MOD 30 MIN: CPT | Performed by: INTERNAL MEDICINE

## 2025-03-18 NOTE — PROGRESS NOTES
Wandy Miller MD  Irene Wren  1955 03/18/2025    Patient Active Problem List   Diagnosis    Essential hypertension    History of syncope    Palpitations    Precordial pain    Deep vein thrombosis    Dyslipidemia    Gastroesophageal reflux disease    Mitral valve prolapse    Pulmonary embolism    Dyspnea on exertion       Dear Wandy Miller MD:    Subjective     Irene Wren is a 69 y.o. female with the problems as listed above, presents    Chief complaint: Recent chest pains and shortness of breath.    History of Present Illness: Ms. Irene Cantor is a pleasant 69-year-old  female with no history of known heart disease or coronary artery disease, was recently complaining of chest pains for which she underwent cardiac evaluation with a Lexiscan sestamibi study and an echo Doppler study.  She is here to review the test results.      Her Lexiscan sestamibi study was normal.  Her echo Doppler study revealed normal LV wall motion and systolic function with mild aortic stenosis and mild tricuspid regurgitation with mild pulmonary hypertension.      With regards to event monitor, she could only wear it for 4 days and 18 hours as she had severe allergic reaction to the skin with the monitor patch.  This revealed a predominantly sinus rhythm with rare PACs and PVCs and her symptoms of heart racing, dizziness and skipping correlated with sinus rhythm with occasional PVCs.  She states that her chest pains are actually better but she still has shortness of breath intermittently with the exertion.  She has previous history of pulmonary embolism several years ago.  She took anticoagulation reported for about a year and then stopped.  No recent complaints of leg pains or swelling.    Regadenoson Stress Test with Myocardial Perfusion SPECT (Multi Study)    Accession Number: [id number]   Date of Study: 3/10/25   Ordering Provider: Anand Cornelius MD   Clinical Indications: Chest Pain         [facility]  Performing Staff   ECG [place], SPECT [place]: Anand Cornelius MD    Tech: Zachariah Moscoso RN    Support Staff: John Minor           Show images for Stress Test With Myocardial Perfusion (1 Day)   Interpretation Summary     A stress test was performed following the Yonny protocol.    Exercise duration (min) 4 min Exercise duration (sec) 0 sec Estimated workload 7 METS    Baseline HR 88 bpm Baseline /98 mmHg Peak Stress Vitals Peak  bpm Peak /82 mmHg Recovery Vitals Recovery HR 86 bpm Recovery /85 mmHg Exercise Data Target HR (85%) 128 bpm Max. Pred. HR (100%) 150 bpm Percent Max Pred  %    Patient denied any chest discomfort during exercise    Non-specific ST-T wave changes noted during stress.    Findings consistent with an indeterminate ECG stress test.    Nuclear Perfusion Findings    Myocardial perfusion imaging indicates a normal myocardial perfusion study with no evidence of ischemia.    Normal LV cavity size. Normal LV wall motion noted.    Left ventricular ejection fraction is normal (Calculated EF = 70%).    Impressions are consistent with a low risk study.    Complete Transthoracic Echocardiogram with Complete Doppler and Color Flow    Accession Number: [id number]   Date of Study: 3/10/25   Ordering Provider: Anand Cornelius MD   Clinical Indications: Dyspnea, Murmur or Click        [facility]  Performing Staff   Cardiology: Anand Cornelius MD    Tech: Miracle Turner         Patient Hx Of Height, Weight, and Vitals    Height Weight BSA (Calculated - sq m) BMI (Calculated) Retired BMI (kg/m2) Pulse BP                Show images for Adult Transthoracic Echo Complete w/ Color, Spectral and Contrast if necessary per protocol   Clinical Indication    Dyspnea; Murmur or Click   Dx: Dyspnea on exertion [R06.09 (ICD-10-CM)]     Interpretation Summary     Normal left ventricular cavity size and wall thickness noted. All left  ventricular wall segments contract normally.    Left ventricular ejection fraction appears to be 61 - 65%.    Left ventricular diastolic function is consistent with (grade I) impaired relaxation.    The aortic valve exhibits sclerosis. The aortic valve was poorly visualized but appears trileaflet. No aortic valve regurgitation is present. Mild aortic valve stenosis is present.    The mitral valve is structurally normal with no significant stenosis present. Trace mitral valve regurgitation is present.    Mild tricuspid valve regurgitation is present. Estimated right ventricular systolic pressure from tricuspid regurgitation is mildly elevated (35-45 mmHg).    There is no evidence of pericardial effusion. .    Cardiac Event Monitor - Event Connect/Record/Disconnect with Interp ([id number]) Clinic    Accession Number: [id number]   Date of Study: 2/4/25   Ordering Provider: Anand Cornelius MD   Clinical Indications: Syncope and collapse [R55 (ICD-10-CM)], Palpitations [R00.2 (ICD-10-CM)]        [facility]  Performing Staff   Cardiology: Anand Cornelius MD    Tech: Xochitl Acevedo MA             Interpretation Summary     The patient was monitored for 4 days 17 hours and 59 minutes.    The predominant rhythm noted during the testing period was sinus rhythm.    . Average HR: 84. Min HR: 63. Max HR: 156.    Rare PACs and PVCs    No bradycardia,AV block or long pauses.    Symptoms of heart racing,dizziness and skipping correlated with sinus rythm with occasional PVCs and nondiagnostic ST/T changes.    Allergies   Allergen Reactions    Codeine     Morphine Other (See Comments)     Her whole body hurt.    Penicillins     Sulfa Antibiotics     Latex Rash   :    Current Outpatient Medications:     aspirin 81 MG EC tablet, Take 1 tablet by mouth Daily., Disp: 30 tablet, Rfl: 1    Cyanocobalamin (VITAMIN B-12 PO), Take  by mouth., Disp: , Rfl:     DULoxetine (CYMBALTA) 60 MG capsule, Take 2 capsules by mouth  "Daily. Takes 2 60mg tab, Disp: , Rfl:     levothyroxine (SYNTHROID, LEVOTHROID) 50 MCG tablet, Take 1 tablet by mouth Every Morning., Disp: , Rfl:     linagliptin (TRADJENTA) 5 MG tablet tablet, Take 1 tablet by mouth Daily., Disp: , Rfl:     olmesartan (BENICAR) 20 MG tablet, Take 1 tablet by mouth Daily., Disp: , Rfl:     rosuvastatin (CRESTOR) 20 MG tablet, Take 1 tablet by mouth Daily., Disp: 30 tablet, Rfl: 11    travoprost, ELIZABETH free, (TRAVATAN) 0.004 % solution ophthalmic solution, Apply 1 drop to eye(s) as directed by provider., Disp: , Rfl:     vitamin D (ERGOCALCIFEROL) 1.25 MG (87258 UT) capsule capsule, Take 1 capsule by mouth 1 (One) Time Per Week., Disp: , Rfl:     The following portions of the patient's history were reviewed and updated as appropriate: allergies, current medications, past family history, past medical history, past social history, past surgical history and problem list.    Social History     Tobacco Use    Smoking status: Never    Smokeless tobacco: Never   Vaping Use    Vaping status: Never Used   Substance Use Topics    Alcohol use: Yes     Comment: social    Drug use: No     Review of Systems   Constitutional: Negative for chills and fever.   HENT:  Negative for nosebleeds and sore throat.    Respiratory:  Positive for shortness of breath. Negative for cough, hemoptysis and wheezing.    Gastrointestinal:  Negative for abdominal pain, hematemesis, hematochezia, melena, nausea and vomiting.   Genitourinary:  Negative for dysuria and hematuria.   Neurological:  Negative for headaches.     Objective   Vitals:    03/18/25 1352   BP: 143/78   BP Location: Left arm   Patient Position: Sitting   Cuff Size: Adult   Pulse: 93   SpO2: 99%   Weight: 79.4 kg (175 lb)   Height: 162.6 cm (64\")     Body mass index is 30.04 kg/m².    Vitals reviewed.   Constitutional:       Appearance: Well-developed.   Eyes:      Conjunctiva/sclera: Conjunctivae normal.   HENT:      Head: Normocephalic.   Neck:      " "Thyroid: No thyromegaly.      Vascular: No JVD.      Trachea: No tracheal deviation.   Pulmonary:      Effort: No respiratory distress.      Breath sounds: Normal breath sounds. No wheezing. No rales.   Cardiovascular:      PMI at left midclavicular line. Normal rate. Regular rhythm. Normal S1. Normal S2.       Murmurs: There is a grade 2/6 harsh midsystolic murmur at the URSB, radiating to the neck.      No gallop.  No click. No rub.   Pulses:     Intact distal pulses.   Edema:     Peripheral edema absent.   Abdominal:      General: Bowel sounds are normal.      Palpations: Abdomen is soft. There is no abdominal mass.      Tenderness: There is no abdominal tenderness.   Musculoskeletal:      Cervical back: Normal range of motion and neck supple. Skin:     General: Skin is warm and dry.   Neurological:      Mental Status: Alert and oriented to person, place, and time.      Cranial Nerves: No cranial nerve deficit.       Lab Results   Component Value Date     09/20/2023    K 4.7 09/20/2023     09/20/2023    CO2 25.9 09/20/2023    BUN 20 09/20/2023    CREATININE 1.25 (H) 09/20/2023    GLUCOSE 109 (H) 09/20/2023    CALCIUM 9.5 09/20/2023    AST 23 09/20/2023    ALT 16 09/20/2023    ALKPHOS 78 09/20/2023     No results found for: \"CKTOTAL\"  Lab Results   Component Value Date    WBC 7.4 01/13/2025    HGB 12.8 01/13/2025    HCT 39.8 01/13/2025     01/13/2025     Lab Results   Component Value Date    INR 0.93 02/05/2023     No results found for: \"MG\"  Lab Results   Component Value Date    TSH 1.550 09/20/2023    TRIG 108 09/20/2023    HDL 41 09/20/2023     (H) 09/20/2023        Assessment & Plan    Diagnosis Plan   1. Dyspnea on exertion  NM Lung Quantitative Differential Function Ventilation Perfusion      2. Aortic stenosis, mild        3. Essential hypertension        4. Mild pulmonary hypertension          Recommendations  Will evaluate her dyspnea with the ventilation/perfusion lung " scan.    Return in about 4 weeks (around 4/15/2025).    As always,   I appreciate very much the opportunity to participate in the cardiovascular care of your patients. Please do not hesitate to call me with any questions with regards to Irene Wren's evaluation and management.       With Best Regards,        Anand Cornelius MD, [facility]    Please note that portions of this note were completed with a voice recognition program.

## 2025-03-18 NOTE — LETTER
March 23, 2025     Wandy Miller MD  [address]  [place] [address]  [facility] [address]    Patient: Irene Wren   YOB: 1955   Date of Visit: 3/18/2025     Dear Wandy Miller MD:       Thank you for referring Irene Wren to me for evaluation. Below are the relevant portions of my assessment and plan of care.    If you have questions, please do not hesitate to call me. I look forward to following Irene along with you.         Sincerely,        Anand Cornelius MD        CC: No Recipients    Anand Cornelius MD  03/23/25 1238  Sign when Signing Visit  Wandy Miller MD  Irene Wren  1955 03/18/2025    Patient Active Problem List   Diagnosis   • Essential hypertension   • History of syncope   • Palpitations   • Precordial pain   • Deep vein thrombosis   • Dyslipidemia   • Gastroesophageal reflux disease   • Mitral valve prolapse   • Pulmonary embolism   • Dyspnea on exertion       Dear Wandy Miller MD:    Subjective     Irene Wren is a 69 y.o. female with the problems as listed above, presents    Chief complaint: Recent chest pains and shortness of breath.    History of Present Illness: Ms. Irene Cantor is a pleasant 69-year-old  female with no history of known heart disease or coronary artery disease, was recently complaining of chest pains for which she underwent cardiac evaluation with a Lexiscan sestamibi study and an echo Doppler study.  She is here to review the test results.      Her Lexiscan sestamibi study was normal.  Her echo Doppler study revealed normal LV wall motion and systolic function with mild aortic stenosis and mild tricuspid regurgitation with mild pulmonary hypertension.      With regards to event monitor, she could only wear it for 4 days and 18 hours as she had severe allergic reaction to the skin with the monitor patch.  This revealed a predominantly sinus rhythm with rare PACs and PVCs and her symptoms of  heart racing, dizziness and skipping correlated with sinus rhythm with occasional PVCs.  She states that her chest pains are actually better but she still has shortness of breath intermittently with the exertion.  She has previous history of pulmonary embolism several years ago.  She took anticoagulation reported for about a year and then stopped.  No recent complaints of leg pains or swelling.    Regadenoson Stress Test with Myocardial Perfusion SPECT (Multi Study)    Accession Number: [id number]   Date of Study: 3/10/25   Ordering Provider: Anand Cornelius MD   Clinical Indications: Chest Pain        [facility]  Performing Staff   ECG [place], SPECT [place]: Anand Cornelius MD    Tech: Zachariah Moscoso RN    Support Staff: John Minor           Show images for Stress Test With Myocardial Perfusion (1 Day)   Interpretation Summary   •  A stress test was performed following the Yonny protocol.  •  Exercise duration (min) 4 min Exercise duration (sec) 0 sec Estimated workload 7 METS  •  Baseline HR 88 bpm Baseline /98 mmHg Peak Stress Vitals Peak  bpm Peak /82 mmHg Recovery Vitals Recovery HR 86 bpm Recovery /85 mmHg Exercise Data Target HR (85%) 128 bpm Max. Pred. HR (100%) 150 bpm Percent Max Pred  %  •  Patient denied any chest discomfort during exercise  •  Non-specific ST-T wave changes noted during stress.  •  Findings consistent with an indeterminate ECG stress test.  •  Nuclear Perfusion Findings  •  Myocardial perfusion imaging indicates a normal myocardial perfusion study with no evidence of ischemia.  •  Normal LV cavity size. Normal LV wall motion noted.  •  Left ventricular ejection fraction is normal (Calculated EF = 70%).  •  Impressions are consistent with a low risk study.    Complete Transthoracic Echocardiogram with Complete Doppler and Color Flow    Accession Number: [id number]   Date of Study: 3/10/25   Ordering Provider: Anand Cornelius MD    Clinical Indications: Dyspnea, Murmur or Click        [facility]  Performing Staff   Cardiology: Anand Cornelius MD    Tech: Miracle Turner         Patient Hx Of Height, Weight, and Vitals    Height Weight BSA (Calculated - sq m) BMI (Calculated) Retired BMI (kg/m2) Pulse BP                Show images for Adult Transthoracic Echo Complete w/ Color, Spectral and Contrast if necessary per protocol   Clinical Indication    Dyspnea; Murmur or Click   Dx: Dyspnea on exertion [R06.09 (ICD-10-CM)]     Interpretation Summary   •  Normal left ventricular cavity size and wall thickness noted. All left ventricular wall segments contract normally.  •  Left ventricular ejection fraction appears to be 61 - 65%.  •  Left ventricular diastolic function is consistent with (grade I) impaired relaxation.  •  The aortic valve exhibits sclerosis. The aortic valve was poorly visualized but appears trileaflet. No aortic valve regurgitation is present. Mild aortic valve stenosis is present.  •  The mitral valve is structurally normal with no significant stenosis present. Trace mitral valve regurgitation is present.  •  Mild tricuspid valve regurgitation is present. Estimated right ventricular systolic pressure from tricuspid regurgitation is mildly elevated (35-45 mmHg).  •  There is no evidence of pericardial effusion. .    Cardiac Event Monitor - Event Connect/Record/Disconnect with Interp ([id number]) Clinic    Accession Number: [id number]   Date of Study: 2/4/25   Ordering Provider: Anand Cornelius MD   Clinical Indications: Syncope and collapse [R55 (ICD-10-CM)], Palpitations [R00.2 (ICD-10-CM)]        [facility]  Performing Staff   Cardiology: Anand Cornelius MD    Tech: Xochitl Acevedo MA             Interpretation Summary   •  The patient was monitored for 4 days 17 hours and 59 minutes.  •  The predominant rhythm noted during the testing period was sinus rhythm.  •  . Average HR: 84. Min HR: 63. Max  HR: 156.  •  Rare PACs and PVCs  •  No bradycardia,AV block or long pauses.  •  Symptoms of heart racing,dizziness and skipping correlated with sinus rythm with occasional PVCs and nondiagnostic ST/T changes.    Allergies   Allergen Reactions   • Codeine    • Morphine Other (See Comments)     Her whole body hurt.   • Penicillins    • Sulfa Antibiotics    • Latex Rash   :    Current Outpatient Medications:   •  aspirin 81 MG EC tablet, Take 1 tablet by mouth Daily., Disp: 30 tablet, Rfl: 1  •  Cyanocobalamin (VITAMIN B-12 PO), Take  by mouth., Disp: , Rfl:   •  DULoxetine (CYMBALTA) 60 MG capsule, Take 2 capsules by mouth Daily. Takes 2 60mg tab, Disp: , Rfl:   •  levothyroxine (SYNTHROID, LEVOTHROID) 50 MCG tablet, Take 1 tablet by mouth Every Morning., Disp: , Rfl:   •  linagliptin (TRADJENTA) 5 MG tablet tablet, Take 1 tablet by mouth Daily., Disp: , Rfl:   •  olmesartan (BENICAR) 20 MG tablet, Take 1 tablet by mouth Daily., Disp: , Rfl:   •  rosuvastatin (CRESTOR) 20 MG tablet, Take 1 tablet by mouth Daily., Disp: 30 tablet, Rfl: 11  •  travoprost, ELIZABETH free, (TRAVATAN) 0.004 % solution ophthalmic solution, Apply 1 drop to eye(s) as directed by provider., Disp: , Rfl:   •  vitamin D (ERGOCALCIFEROL) 1.25 MG (10386 UT) capsule capsule, Take 1 capsule by mouth 1 (One) Time Per Week., Disp: , Rfl:     The following portions of the patient's history were reviewed and updated as appropriate: allergies, current medications, past family history, past medical history, past social history, past surgical history and problem list.    Social History     Tobacco Use   • Smoking status: Never   • Smokeless tobacco: Never   Vaping Use   • Vaping status: Never Used   Substance Use Topics   • Alcohol use: Yes     Comment: social   • Drug use: No     Review of Systems   Constitutional: Negative for chills and fever.   HENT:  Negative for nosebleeds and sore throat.    Respiratory:  Positive for shortness of breath. Negative for  "cough, hemoptysis and wheezing.    Gastrointestinal:  Negative for abdominal pain, hematemesis, hematochezia, melena, nausea and vomiting.   Genitourinary:  Negative for dysuria and hematuria.   Neurological:  Negative for headaches.     Objective   Vitals:    03/18/25 1352   BP: 143/78   BP Location: Left arm   Patient Position: Sitting   Cuff Size: Adult   Pulse: 93   SpO2: 99%   Weight: 79.4 kg (175 lb)   Height: 162.6 cm (64\")     Body mass index is 30.04 kg/m².    Vitals reviewed.   Constitutional:       Appearance: Well-developed.   Eyes:      Conjunctiva/sclera: Conjunctivae normal.   HENT:      Head: Normocephalic.   Neck:      Thyroid: No thyromegaly.      Vascular: No JVD.      Trachea: No tracheal deviation.   Pulmonary:      Effort: No respiratory distress.      Breath sounds: Normal breath sounds. No wheezing. No rales.   Cardiovascular:      PMI at left midclavicular line. Normal rate. Regular rhythm. Normal S1. Normal S2.       Murmurs: There is a grade 2/6 harsh midsystolic murmur at the URSB, radiating to the neck.      No gallop.  No click. No rub.   Pulses:     Intact distal pulses.   Edema:     Peripheral edema absent.   Abdominal:      General: Bowel sounds are normal.      Palpations: Abdomen is soft. There is no abdominal mass.      Tenderness: There is no abdominal tenderness.   Musculoskeletal:      Cervical back: Normal range of motion and neck supple. Skin:     General: Skin is warm and dry.   Neurological:      Mental Status: Alert and oriented to person, place, and time.      Cranial Nerves: No cranial nerve deficit.       Lab Results   Component Value Date     09/20/2023    K 4.7 09/20/2023     09/20/2023    CO2 25.9 09/20/2023    BUN 20 09/20/2023    CREATININE 1.25 (H) 09/20/2023    GLUCOSE 109 (H) 09/20/2023    CALCIUM 9.5 09/20/2023    AST 23 09/20/2023    ALT 16 09/20/2023    ALKPHOS 78 09/20/2023     No results found for: \"CKTOTAL\"  Lab Results   Component Value Date " "   WBC 7.4 01/13/2025    HGB 12.8 01/13/2025    HCT 39.8 01/13/2025     01/13/2025     Lab Results   Component Value Date    INR 0.93 02/05/2023     No results found for: \"MG\"  Lab Results   Component Value Date    TSH 1.550 09/20/2023    TRIG 108 09/20/2023    HDL 41 09/20/2023     (H) 09/20/2023        Assessment & Plan    Diagnosis Plan   1. Dyspnea on exertion  NM Lung Quantitative Differential Function Ventilation Perfusion      2. Aortic stenosis, mild        3. Essential hypertension        4. Mild pulmonary hypertension          Recommendations  Will evaluate her dyspnea with the ventilation/perfusion lung scan.    Return in about 4 weeks (around 4/15/2025).    As always,   I appreciate very much the opportunity to participate in the cardiovascular care of your patients. Please do not hesitate to call me with any questions with regards to Irene Wren's evaluation and management.       With Best Regards,        Anand Cornelius MD, FACC    Please note that portions of this note were completed with a voice recognition program.          "

## 2025-03-26 ENCOUNTER — TELEPHONE (OUTPATIENT)
Dept: CARDIOLOGY | Facility: CLINIC | Age: 70
End: 2025-03-26
Payer: MEDICARE

## 2025-03-26 NOTE — TELEPHONE ENCOUNTER
Caller: Irene Wren    Relationship: Self    Best call back number: [phone]    What is the best time to reach you: ANY    Who are you requesting to speak with (clinical staff, provider,  specific staff member): CLINICAL    What was the call regarding:  PT IS REQUESTING IF WE COULD FAX HER LABS OVER TO HER PCP ON FILE, GRIS SALOMON.

## 2025-04-09 ENCOUNTER — HOSPITAL ENCOUNTER (OUTPATIENT)
Dept: NUCLEAR MEDICINE | Facility: HOSPITAL | Age: 70
Discharge: HOME | End: 2025-04-09
Payer: MEDICARE

## 2025-04-09 DIAGNOSIS — R06.09 DYSPNEA ON EXERTION: ICD-10-CM

## 2025-04-09 PROCEDURE — A9540 TC99M MAA: HCPCS | Performed by: INTERNAL MEDICINE

## 2025-04-09 PROCEDURE — 78579 LUNG VENTILATION IMAGING: CPT

## 2025-04-09 PROCEDURE — A9567 TECHNETIUM TC-99M AEROSOL: HCPCS | Performed by: INTERNAL MEDICINE

## 2025-04-09 PROCEDURE — 34310000005 TECHNETIUM ALBUMIN AGGREGATED: Performed by: INTERNAL MEDICINE

## 2025-04-09 PROCEDURE — 78579 LUNG VENTILATION IMAGING: CPT | Performed by: RADIOLOGY

## 2025-04-09 PROCEDURE — 34310000005 TECHNETIUM TC 99M PENTETATE INHALER: Performed by: INTERNAL MEDICINE

## 2025-04-09 RX ADMIN — KIT FOR THE PREPARATION OF TECHNETIUM TC 99M PENTETATE 1 DOSE: 20 INJECTION, POWDER, LYOPHILIZED, FOR SOLUTION INTRAVENOUS; RESPIRATORY (INHALATION) at 14:49

## 2025-04-09 RX ADMIN — KIT FOR THE PREPARATION OF TECHNETIUM TC 99M ALBUMIN AGGREGATED 1 DOSE: 2.5 INJECTION, POWDER, FOR SOLUTION INTRAVENOUS at 14:55

## 2025-04-28 ENCOUNTER — TELEPHONE (OUTPATIENT)
Dept: CARDIOLOGY | Facility: CLINIC | Age: 70
End: 2025-04-28
Payer: MEDICARE

## 2025-04-28 NOTE — TELEPHONE ENCOUNTER
Caller: Irene Wren    Relationship to patient: Self    Best call back number: [phone]    Chief complaint: WOULD LIKE TO SWITCH HER APPT ON 04.30.25 TO THE EAST LOCATION ON A TUESDAY     Type of visit: 4 WEEK    Requested date: TUESDAY FOR EAST LOCATION      If rescheduling, when is the original appointment: 04.30.25

## 2025-05-05 ENCOUNTER — HOSPITAL ENCOUNTER (OUTPATIENT)
Dept: MAMMOGRAPHY | Facility: HOSPITAL | Age: 70
Discharge: HOME | End: 2025-05-05
Admitting: FAMILY MEDICINE
Payer: MEDICARE

## 2025-05-05 DIAGNOSIS — Z12.31 VISIT FOR SCREENING MAMMOGRAM: ICD-10-CM

## 2025-05-05 PROCEDURE — 77067 SCR MAMMO BI INCL CAD: CPT

## 2025-05-05 PROCEDURE — 77067 SCR MAMMO BI INCL CAD: CPT | Performed by: RADIOLOGY

## 2025-05-05 PROCEDURE — 77063 BREAST TOMOSYNTHESIS BI: CPT | Performed by: RADIOLOGY

## 2025-05-05 PROCEDURE — 77063 BREAST TOMOSYNTHESIS BI: CPT
